# Patient Record
Sex: FEMALE | Race: WHITE | NOT HISPANIC OR LATINO | Employment: FULL TIME | URBAN - METROPOLITAN AREA
[De-identification: names, ages, dates, MRNs, and addresses within clinical notes are randomized per-mention and may not be internally consistent; named-entity substitution may affect disease eponyms.]

---
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
## 2020-01-10 NOTE — THERAPY TREATMENT NOTE
"Outpatient Occupational Therapy Ortho Treatment Note  GERARDO Mcguire     Patient Name: Nelda Givens  : 2000  MRN: [id number]  Today's Date: 10/4/2017        Visit Date: 10/04/2017    There is no problem list on file for this patient.       Past Medical History:   Diagnosis Date   • Asthma         No past surgical history on file.      Visit Dx:    ICD-10-CM ICD-9-CM   1. Sprain of left wrist, subsequent encounter S63.502D V58.89     842.00             OT Ortho       10/04/17 1400          Subjective Pain    Able to rate subjective pain? yes  -EN      Pre-Treatment Pain Level 4  -EN      Subjective Pain Comment \"The pain is finally starting to go down.\"  -EN      Left Wrist    Flexion AROM Deficit 91  -EN      Extension AROM Deficit 67  -EN      LUE Edema - Circumference (cm)    Wrist Crease --   2\" above wrist crease- 17.1 cm  -EN        User Key  (r) = Recorded By, (t) = Taken By, (c) = Cosigned By    Initials Name Provider Type    GARETT Luna Occupational Therapist             Hand Therapy (last 24 hours)      Hand Eval       10/04/17 1500           Strength Left    # Reps 1  -EN      Left Rung 2  -EN      Left  Test 1 46  -EN       Strength Average Left 46  -EN        User Key  (r) = Recorded By, (t) = Taken By, (c) = Cosigned By    Initials Name Provider Type    GARETT Luna Occupational Therapist                    Therapy Education       10/04/17 1518          Therapy Education    Given HEP  -EN      Program Progressed  -EN      How Provided Verbal  -EN      Provided to Patient  -EN      Level of Understanding Verbalized  -EN        User Key  (r) = Recorded By, (t) = Taken By, (c) = Cosigned By    Initials Name Provider Type    GARETT Luna Occupational Therapist                OT Assessment/Plan       10/04/17 1516       OT Assessment    Impairments Edema;Range of motion;Muscle strength;Pain  -EN     Assessment Comments Patient with significant " improvement with wrist ROM, strength, and decrease in pain.  Patient reports pain down to 4/10 at rest.  Progressing with strengthening program.  -EN     OT Plan    OT Plan Comments MD appointment today.  Continue with goals.  -EN       User Key  (r) = Recorded By, (t) = Taken By, (c) = Cosigned By    Initials Name Provider Type    GARETT Luna Occupational Therapist                     Modalities       10/04/17 1439          Moist Heat    MH Applied Yes  -EN      Location left wrist/forearm  -EN      Rx Minutes 15 mins  -EN      Ultrasound 14959    Location left dorsal forearm  -EN      Rx Minutes 8  -EN      Duty Cycle 50  -EN      Frequency 3.0 MHz  -EN      Intensity - Wts/cm 1  -EN        User Key  (r) = Recorded By, (t) = Taken By, (c) = Cosigned By    Initials Name Provider Type    GARETT Luna Occupational Therapist                OT Exercises       10/04/17 1400          Exercise 1    Exercise Name 1 retrograde edema massage, left wrist and forearm  -EN      Exercise 2    Exercise Name 2 Wrist ROM exercises  -EN      Exercise 3    Exercise Name 3 wrist strengthening- Flexion/extension  -EN      Cueing 3 Verbal  -EN      Equipment 3 Dumbell  -EN      Weights/Plates 3 1  -EN      Sets 3 1  -EN      Reps 3 10  -EN      Exercise 4    Exercise Name 4 Hand strenghening-  -EN      Cueing 4 Verbal  -EN      Equipment 4 Hand Gripper   digiflex -red (5reps, hold 3 seconds)  -EN      Weights/Plates 4 20  -EN      Sets 4 1  -EN      Reps 4 10  -EN      Time (Seconds) 4 3  -EN      Exercise 5    Exercise Name 5 forearm strengthening- Pronation/supination  -EN      Cueing 5 Verbal   slight increase in pain with strenghtening  -EN      Equipment 5 Dumbell  -EN      Weights/Plates 5 1  -EN      Sets 5 1  -EN      Reps 5 5  -EN        User Key  (r) = Recorded By, (t) = Taken By, (c) = Cosigned By    Initials Name Provider Type    GARETT Luna Occupational Therapist                     Outcome Measures       10/02/17 0800          Quick DASH    Open a tight or new jar. 3  -EN      Do heavy household chores (e.g., wash walls, wash floors) 3  -EN      Carry a shopping bag or briefcase 2  -EN      Wash your back 1  -EN      Use a knife to cut food 2  -EN      Recreational activities in which you take some force or impact through your arm, should or hand (e.g. golf, hammering, tennis, etc.) 4  -EN      During the past week, to what extent has your arm, shoulder, or hand problem interfered with your normal social activites with family, friends, neighbors or groups? 3  -EN      During the past week, were you limited in your work or other regular daily activities as a result of your arm, shoulder or hand problem? 4  -EN      Arm, Shoulder, or hand pain 4  -EN      Tingling (pins and needles) in your arm, shoulder, or hand 3  -EN      During the past week, how much difficulty have you had sleeping because of the pain in your arm, shoulder or hand? 3  -EN      Number of Questions Answered 11  -EN      Quick DASH Score 47.73  -EN      Work Module (Optional)    Using your usual technique for your work? 3  -EN      Doing your usual work because of arm, shoulder or hand pain? 3  -EN      Doing your work as well as you would like? 3  -EN      Spending your usual amount of time doing your work? 3  -EN      Work Module Score 50  -EN      Functional Assessment    Outcome Measure Options Quick DASH  -EN        User Key  (r) = Recorded By, (t) = Taken By, (c) = Cosigned By    Initials Name Provider Type    GARTET Luna Occupational Therapist              Time Calculation:   OT Start Time: 1439  OT Stop Time: 1517  OT Time Calculation (min): 38 min     Therapy Charges for Today     Code Description Service Date Service Provider Modifiers Qty    [id number]  OT THERAPEUTIC ACT EA 15 MIN 10/4/2017 GARETT Chopra GO 1    [id number]  OT HOT OR COLD PACK TREAT MCARE 10/4/2017 Sachi MOSQUERA  GARETT Zhou GO 1    [id number]  OT ULTRASOUND EA 15 MIN 10/4/2017 GARETT Chopra GO 1                    GARETT Blandon  10/4/2017   No excercise

## 2020-07-13 ENCOUNTER — HOSPITAL ENCOUNTER (EMERGENCY)
Facility: HOSPITAL | Age: 20
Discharge: LEFT BEFORE DISCHARGE | End: 2020-07-13

## 2020-07-13 VITALS
WEIGHT: 195 LBS | SYSTOLIC BLOOD PRESSURE: 127 MMHG | DIASTOLIC BLOOD PRESSURE: 80 MMHG | OXYGEN SATURATION: 97 % | TEMPERATURE: 98.1 F | HEART RATE: 71 BPM | HEIGHT: 64 IN | RESPIRATION RATE: 16 BRPM | BODY MASS INDEX: 33.29 KG/M2

## 2020-12-02 ENCOUNTER — PROCEDURE VISIT (OUTPATIENT)
Dept: OBSTETRICS AND GYNECOLOGY | Age: 20
End: 2020-12-02

## 2020-12-02 ENCOUNTER — OFFICE VISIT (OUTPATIENT)
Dept: OBSTETRICS AND GYNECOLOGY | Age: 20
End: 2020-12-02

## 2020-12-02 VITALS
SYSTOLIC BLOOD PRESSURE: 124 MMHG | HEIGHT: 63 IN | WEIGHT: 196 LBS | DIASTOLIC BLOOD PRESSURE: 70 MMHG | BODY MASS INDEX: 34.73 KG/M2

## 2020-12-02 DIAGNOSIS — O36.80X0 ENCOUNTER TO DETERMINE FETAL VIABILITY OF PREGNANCY, SINGLE OR UNSPECIFIED FETUS: Primary | ICD-10-CM

## 2020-12-02 DIAGNOSIS — Z34.00 SUPERVISION OF NORMAL FIRST PREGNANCY, ANTEPARTUM: ICD-10-CM

## 2020-12-02 DIAGNOSIS — Z01.419 WELL WOMAN EXAM WITH ROUTINE GYNECOLOGICAL EXAM: Primary | ICD-10-CM

## 2020-12-02 LAB
C TRACH RRNA SPEC DONR QL NAA+PROBE: NEGATIVE
N GONORRHOEA DNA SPEC QL NAA+PROBE: NEGATIVE

## 2020-12-02 PROCEDURE — 99385 PREV VISIT NEW AGE 18-39: CPT | Performed by: NURSE PRACTITIONER

## 2020-12-02 PROCEDURE — 76801 OB US < 14 WKS SINGLE FETUS: CPT | Performed by: OBSTETRICS & GYNECOLOGY

## 2020-12-02 NOTE — PROGRESS NOTES
[facility]  Routine Annual Visit    2020    Patient: Madie Givens          MR#:[id number]      History of Present Illness    20 y.o. female  who presents for annual exam and confirmation of pregnancy as a new patient.    US confirms viable IUP at 10w3d. .  This is Madie's first pregnancy  She is excited and feeling well  Denies any significant medical hx.  Asthma- no problems in years she reports  She is taking a prenatal vitamin  No complaints today    Patient's last menstrual period was 2020.  Obstetric History:  OB History        1    Para   0    Term   0       0    AB   0    Living   0       SAB   0    TAB   0    Ectopic   0    Molar   0    Multiple   0    Live Births   0               Menstrual History:     Patient's last menstrual period was 2020.       Sexual History:       ________________________________________  Patient Active Problem List   Diagnosis   • Supervision of normal first pregnancy, antepartum       Past Medical History:   Diagnosis Date   • Asthma        History reviewed. No pertinent surgical history.    Social History     Tobacco Use   Smoking Status Never Smoker   Smokeless Tobacco Never Used       Family History   Problem Relation Age of Onset   • No Known Problems Mother    • No Known Problems Father        Prior to Admission medications    Medication Sig Start Date End Date Taking? Authorizing Provider   Chlorcyclizine-Pseudoephed (STAHIST AD) 25-60 MG tablet Take 1 tablet by mouth 3 (Three) Times a Day As Needed (for congestion). 8/3/19 12/2/20  Keren Denton APRN   promethazine-dextromethorphan (PROMETHAZINE-DM) 6.25-15 MG/5ML syrup Take 5 mL by mouth 4 (Four) Times a Day As Needed for Cough. 8/3/19 12/2/20  Keren Denton APRN   TRI FEMYNOR 0.18/0.215/0.25 MG-35 MCG per tablet Take 1 tablet by mouth Daily. 19  Emergency, Nurse Ralph, MARCELINO     ________________________________________      The following  "portions of the patient's history were reviewed and updated as appropriate: allergies, current medications, past family history, past medical history, past social history, past surgical history and problem list.    Review of Systems   Constitutional: Negative.    HENT: Negative.    Eyes: Negative for visual disturbance.   Respiratory: Negative for cough, shortness of breath and wheezing.    Cardiovascular: Negative for chest pain, palpitations and leg swelling.   Gastrointestinal: Negative for abdominal distention, abdominal pain, blood in stool, constipation, diarrhea, nausea and vomiting.   Endocrine: Negative for cold intolerance and heat intolerance.   Genitourinary: Negative for difficulty urinating, dyspareunia, dysuria, frequency, genital sores, hematuria, menstrual problem, pelvic pain, urgency, vaginal bleeding, vaginal discharge and vaginal pain.   Musculoskeletal: Negative.    Skin: Negative.    Neurological: Negative for dizziness, weakness, light-headedness, numbness and headaches.   Hematological: Negative.    Psychiatric/Behavioral: Negative.    Breasts: negative for lumps skin changes, dimpling, swelling, nipple changes/discharge bilaterally           Objective   Physical Exam    /70   Ht 160 cm (63\")   Wt 88.9 kg (196 lb)   LMP 09/20/2020   Breastfeeding No   BMI 34.72 kg/m²    BP Readings from Last 3 Encounters:   12/02/20 124/70   07/13/20 127/80   08/03/19 125/73      Wt Readings from Last 3 Encounters:   12/02/20 88.9 kg (196 lb)   07/13/20 88.5 kg (195 lb)   08/03/19 82.1 kg (181 lb)        BMI: Estimated body mass index is 34.72 kg/m² as calculated from the following:    Height as of this encounter: 160 cm (63\").    Weight as of this encounter: 88.9 kg (196 lb).            General:   alert, appears stated age and cooperative   Heart: regular rate and rhythm, S1, S2 normal, no murmur, click, rub or gallop   Lungs: clear to auscultation bilaterally   Abdomen: soft, non-tender, without " masses or organomegaly   Breast: inspection negative, no nipple discharge or bleeding, no masses or nodularity palpable   Vulva: External genitalia including bartholin's glands, Urethra, Tarrant's gland and urethra meatus are normal, Perineum, rectum and anus appear normal  and Bladder appears normal without significant prolapse    Vagina: normal mucosa, normal discharge   Cervix: no cervical motion tenderness and no lesions   Uterus: deferred   Adnexa: deferred     Assessment:    1. Normal annual exam  2. Viable IUP  3. Early pregnancy counseling provided   Patient is taking Prenatal vitamins  Problem list reviewed and updated  Reviewed routine prenatal care with the office to include but not limited to expected weight gain during pregnancy, Tylenol products are fine, avoid aspirin and ibuprofen; Zika (travel restrictions/ok to use insect repellant); not to change cat litter; food restrictions; avoidance of alcohol, tobacco, drugs and saunas/hot tubs.   All questions answered.  4. SAB warnings  5.  Counseled on healthy lifestyle modifications, safe sex, condom use, and self breast exams.    RTO 4 weeks OB intake     Plan:    []  Rx:   []  Mammogram request made  []  PAP done  []  Occult fecal blood test ([facility])  []  Labs:   []  GC/Chl/TV  []  DEXA scan   []  Referral for colonoscopy:           FRANK Medina  12/2/2020 11:12 EST

## 2020-12-03 LAB
ABO GROUP BLD: NORMAL
BLD GP AB SCN SERPL QL: NEGATIVE
ERYTHROCYTE [DISTWIDTH] IN BLOOD BY AUTOMATED COUNT: 14.7 % (ref 12.3–15.4)
HBA1C MFR BLD: 5 % (ref 4.8–5.6)
HBV SURFACE AG SERPL QL IA: NEGATIVE
HCT VFR BLD AUTO: 37.3 % (ref 34–46.6)
HCV AB S/CO SERPL IA: <0.1 S/CO RATIO (ref 0–0.9)
HGB A MFR BLD: 98.2 % (ref 96.4–98.8)
HGB A2 MFR BLD COLUMN CHROM: 1.8 % (ref 1.8–3.2)
HGB BLD-MCNC: 11.9 G/DL (ref 12–15.9)
HGB C MFR BLD: 0 %
HGB F MFR BLD: 0 % (ref 0–2)
HGB FRACT BLD-IMP: NORMAL
HGB S BLD QL SOLY: NEGATIVE
HGB S MFR BLD: 0 %
HIV 1+2 AB+HIV1 P24 AG SERPL QL IA: NON REACTIVE
MCH RBC QN AUTO: 26.1 PG (ref 26.6–33)
MCHC RBC AUTO-ENTMCNC: 31.9 G/DL (ref 31.5–35.7)
MCV RBC AUTO: 81.8 FL (ref 79–97)
PLATELET # BLD AUTO: 204 10*3/MM3 (ref 140–450)
RBC # BLD AUTO: 4.56 10*6/MM3 (ref 3.77–5.28)
RH BLD: NEGATIVE
RPR SER QL: NORMAL
RUBV IGG SERPL IA-ACNC: <0.9 INDEX
VZV IGG SER IA-ACNC: <135 INDEX
WBC # BLD AUTO: 2.55 10*3/MM3 (ref 3.4–10.8)

## 2020-12-04 LAB
BACTERIA UR CULT: NORMAL
BACTERIA UR CULT: NORMAL
C TRACH RRNA CVX QL NAA+PROBE: NEGATIVE
CONV .: NORMAL
CYTOLOGIST CVX/VAG CYTO: NORMAL
CYTOLOGY CVX/VAG DOC CYTO: NORMAL
CYTOLOGY CVX/VAG DOC THIN PREP: NORMAL
DX ICD CODE: NORMAL
HIV 1 & 2 AB SER-IMP: NORMAL
N GONORRHOEA RRNA CVX QL NAA+PROBE: NEGATIVE
OTHER STN SPEC: NORMAL
STAT OF ADQ CVX/VAG CYTO-IMP: NORMAL
T VAGINALIS RRNA SPEC QL NAA+PROBE: NEGATIVE

## 2020-12-17 DIAGNOSIS — B96.89 BV (BACTERIAL VAGINOSIS): ICD-10-CM

## 2020-12-17 DIAGNOSIS — D72.819 LEUKOPENIA, UNSPECIFIED TYPE: Primary | ICD-10-CM

## 2020-12-17 DIAGNOSIS — N76.0 BV (BACTERIAL VAGINOSIS): ICD-10-CM

## 2020-12-17 PROBLEM — O09.899 MATERNAL VARICELLA, NON-IMMUNE: Status: ACTIVE | Noted: 2020-12-17

## 2020-12-17 PROBLEM — Z67.91 RH NEGATIVE STATUS DURING PREGNANCY: Status: ACTIVE | Noted: 2020-12-17

## 2020-12-17 PROBLEM — Z28.39 RUBELLA NON-IMMUNE STATUS, ANTEPARTUM: Status: ACTIVE | Noted: 2020-12-17

## 2020-12-17 PROBLEM — O09.899 RUBELLA NON-IMMUNE STATUS, ANTEPARTUM: Status: ACTIVE | Noted: 2020-12-17

## 2020-12-17 PROBLEM — Z28.39 MATERNAL VARICELLA, NON-IMMUNE: Status: ACTIVE | Noted: 2020-12-17

## 2020-12-17 PROBLEM — O26.899 RH NEGATIVE STATUS DURING PREGNANCY: Status: ACTIVE | Noted: 2020-12-17

## 2020-12-17 RX ORDER — METRONIDAZOLE 500 MG/1
500 TABLET ORAL 2 TIMES DAILY
Qty: 14 TABLET | Refills: 0 | Status: SHIPPED | OUTPATIENT
Start: 2020-12-17 | End: 2020-12-24

## 2020-12-17 NOTE — PROGRESS NOTES
Please call patient and notify of BV infection on pap - rx sent to pharmacy - also very low white count - can she come by office for repeat labs?

## 2020-12-22 ENCOUNTER — RESULTS ENCOUNTER (OUTPATIENT)
Dept: OBSTETRICS AND GYNECOLOGY | Age: 20
End: 2020-12-22

## 2020-12-22 ENCOUNTER — INITIAL PRENATAL (OUTPATIENT)
Dept: OBSTETRICS AND GYNECOLOGY | Age: 20
End: 2020-12-22

## 2020-12-22 VITALS — BODY MASS INDEX: 34.37 KG/M2 | DIASTOLIC BLOOD PRESSURE: 70 MMHG | SYSTOLIC BLOOD PRESSURE: 127 MMHG | WEIGHT: 194 LBS

## 2020-12-22 DIAGNOSIS — O09.899 RUBELLA NON-IMMUNE STATUS, ANTEPARTUM: ICD-10-CM

## 2020-12-22 DIAGNOSIS — N76.0 BV (BACTERIAL VAGINOSIS): ICD-10-CM

## 2020-12-22 DIAGNOSIS — O09.899 MATERNAL VARICELLA, NON-IMMUNE: ICD-10-CM

## 2020-12-22 DIAGNOSIS — D72.819 LEUKOPENIA, UNSPECIFIED TYPE: ICD-10-CM

## 2020-12-22 DIAGNOSIS — B96.89 BV (BACTERIAL VAGINOSIS): ICD-10-CM

## 2020-12-22 DIAGNOSIS — O26.891 RH NEGATIVE STATUS DURING PREGNANCY IN FIRST TRIMESTER: ICD-10-CM

## 2020-12-22 DIAGNOSIS — Z28.39 RUBELLA NON-IMMUNE STATUS, ANTEPARTUM: ICD-10-CM

## 2020-12-22 DIAGNOSIS — Z13.89 SCREENING FOR BLOOD OR PROTEIN IN URINE: ICD-10-CM

## 2020-12-22 DIAGNOSIS — Z34.01 ENCOUNTER FOR PRENATAL CARE IN FIRST TRIMESTER OF FIRST PREGNANCY: Primary | ICD-10-CM

## 2020-12-22 DIAGNOSIS — Z28.39 MATERNAL VARICELLA, NON-IMMUNE: ICD-10-CM

## 2020-12-22 DIAGNOSIS — Z67.91 RH NEGATIVE STATUS DURING PREGNANCY IN FIRST TRIMESTER: ICD-10-CM

## 2020-12-22 DIAGNOSIS — O21.9 NAUSEA AND VOMITING DURING PREGNANCY: ICD-10-CM

## 2020-12-22 LAB
BASOPHILS # BLD AUTO: 0.01 10*3/MM3 (ref 0–0.2)
BASOPHILS NFR BLD AUTO: 0.2 % (ref 0–1.5)
BILIRUB BLD-MCNC: ABNORMAL MG/DL
EOSINOPHIL # BLD AUTO: 0.04 10*3/MM3 (ref 0–0.4)
EOSINOPHIL NFR BLD AUTO: 0.7 % (ref 0.3–6.2)
ERYTHROCYTE [DISTWIDTH] IN BLOOD BY AUTOMATED COUNT: 15.7 % (ref 12.3–15.4)
GLUCOSE UR STRIP-MCNC: NEGATIVE MG/DL
HCT VFR BLD AUTO: 33.2 % (ref 34–46.6)
HGB BLD-MCNC: 11.3 G/DL (ref 12–15.9)
IMM GRANULOCYTES # BLD AUTO: 0.02 10*3/MM3 (ref 0–0.05)
IMM GRANULOCYTES NFR BLD AUTO: 0.3 % (ref 0–0.5)
KETONES UR QL: NEGATIVE
LEUKOCYTE EST, POC: ABNORMAL
LYMPHOCYTES # BLD AUTO: 0.72 10*3/MM3 (ref 0.7–3.1)
LYMPHOCYTES NFR BLD AUTO: 12 % (ref 19.6–45.3)
MCH RBC QN AUTO: 28.3 PG (ref 26.6–33)
MCHC RBC AUTO-ENTMCNC: 34 G/DL (ref 31.5–35.7)
MCV RBC AUTO: 83.2 FL (ref 79–97)
MONOCYTES # BLD AUTO: 0.4 10*3/MM3 (ref 0.1–0.9)
MONOCYTES NFR BLD AUTO: 6.7 % (ref 5–12)
NEUTROPHILS # BLD AUTO: 4.79 10*3/MM3 (ref 1.7–7)
NEUTROPHILS NFR BLD AUTO: 80.1 % (ref 42.7–76)
NITRITE UR-MCNC: NEGATIVE MG/ML
NRBC BLD AUTO-RTO: 0 /100 WBC (ref 0–0.2)
PH UR: 6.5 [PH] (ref 5–8)
PLATELET # BLD AUTO: 235 10*3/MM3 (ref 140–450)
PROT UR STRIP-MCNC: NEGATIVE MG/DL
RBC # BLD AUTO: 3.99 10*6/MM3 (ref 3.77–5.28)
RBC # UR STRIP: NEGATIVE /UL
SP GR UR: 1.02 (ref 1–1.03)
UROBILINOGEN UR QL: NORMAL
WBC # BLD AUTO: 5.98 10*3/MM3 (ref 3.4–10.8)

## 2020-12-22 PROCEDURE — 99214 OFFICE O/P EST MOD 30 MIN: CPT | Performed by: OBSTETRICS & GYNECOLOGY

## 2020-12-22 RX ORDER — ONDANSETRON 4 MG/1
4 TABLET, FILM COATED ORAL EVERY 6 HOURS PRN
Qty: 30 TABLET | Refills: 1 | Status: SHIPPED | OUTPATIENT
Start: 2020-12-22 | End: 2021-04-20 | Stop reason: SDUPTHER

## 2020-12-22 NOTE — PROGRESS NOTES
Chief Complaint   Patient presents with   • Routine Prenatal Visit     cc: ob intake , needs CBC labs -low white blood count , pt c/o morning sickness no other complaints , last pap 20 neg pos for BV , Pt declines flu vaccine , would like something prescribed for nausea        HPI: 20 y.o.  at 13w2d presents for ob intake     Vitals:    20 0841   BP: 127/70   Weight: 88 kg (194 lb)       Review of systems:     Gen: negative  CV:     negative  GI: nausea  :   negative  MS:    negative  Neuro: negative  Pul: negative      A/P  1. Intrauterine pregnancy at 13w2d   2. Pregnancy Risk:  HIGH RISK        Diagnoses and all orders for this visit:    1. Encounter for prenatal care in first trimester of first pregnancy (Primary)    2. Screening for blood or protein in urine  -     POC Urinalysis Dipstick    3. Leukopenia, unspecified type    4. BV (bacterial vaginosis)    5. Rh negative status during pregnancy in first trimester    6. Maternal varicella, non-immune    7. Rubella non-immune status, antepartum    8. Nausea and vomiting during pregnancy  -     ondansetron (Zofran) 4 MG tablet; Take 1 tablet by mouth Every 6 (Six) Hours As Needed for Nausea or Vomiting.  Dispense: 30 tablet; Refill: 1        Nutrition and weight gain were addressed.  Practice OB call structure was discussed.       -----------------------  PLAN:   Return in about 5 weeks (around 2021), or ob check and anatomy US.  OB intake done   Prenatal labs reviewed   Leukopenia - recheck CBC today   RH neg - rhogam at 28 weeks   Counseling was given to patient for the following topics: instructions for management and risk factor reductions . Total time of the encounter was 25 minutes and 20 minutes was spend counseling.     Skylar Singh MD  2020 09:08 EST

## 2020-12-23 PROBLEM — D72.819 LEUKOPENIA: Status: RESOLVED | Noted: 2020-12-17 | Resolved: 2020-12-23

## 2021-01-26 ENCOUNTER — ROUTINE PRENATAL (OUTPATIENT)
Dept: OBSTETRICS AND GYNECOLOGY | Age: 21
End: 2021-01-26

## 2021-01-26 VITALS — SYSTOLIC BLOOD PRESSURE: 128 MMHG | DIASTOLIC BLOOD PRESSURE: 70 MMHG | WEIGHT: 190 LBS | BODY MASS INDEX: 33.66 KG/M2

## 2021-01-26 DIAGNOSIS — O09.899 MATERNAL VARICELLA, NON-IMMUNE: ICD-10-CM

## 2021-01-26 DIAGNOSIS — O21.9 NAUSEA AND VOMITING DURING PREGNANCY: ICD-10-CM

## 2021-01-26 DIAGNOSIS — O28.3 ABNORMAL FETAL ULTRASOUND: ICD-10-CM

## 2021-01-26 DIAGNOSIS — Z67.91 RH NEGATIVE STATUS DURING PREGNANCY IN SECOND TRIMESTER: ICD-10-CM

## 2021-01-26 DIAGNOSIS — O09.899 RUBELLA NON-IMMUNE STATUS, ANTEPARTUM: ICD-10-CM

## 2021-01-26 DIAGNOSIS — Z28.39 RUBELLA NON-IMMUNE STATUS, ANTEPARTUM: ICD-10-CM

## 2021-01-26 DIAGNOSIS — Z28.39 MATERNAL VARICELLA, NON-IMMUNE: ICD-10-CM

## 2021-01-26 DIAGNOSIS — Z34.02 ENCOUNTER FOR PRENATAL CARE IN SECOND TRIMESTER OF FIRST PREGNANCY: Primary | ICD-10-CM

## 2021-01-26 DIAGNOSIS — Z13.89 SCREENING FOR BLOOD OR PROTEIN IN URINE: ICD-10-CM

## 2021-01-26 DIAGNOSIS — O26.892 RH NEGATIVE STATUS DURING PREGNANCY IN SECOND TRIMESTER: ICD-10-CM

## 2021-01-26 DIAGNOSIS — O99.210 OBESITY IN PREGNANCY, ANTEPARTUM: ICD-10-CM

## 2021-01-26 LAB
BILIRUB BLD-MCNC: NEGATIVE MG/DL
GLUCOSE UR STRIP-MCNC: NEGATIVE MG/DL
KETONES UR QL: NEGATIVE
LEUKOCYTE EST, POC: NEGATIVE
NITRITE UR-MCNC: NEGATIVE MG/ML
PH UR: 7 [PH] (ref 5–8)
PROT UR STRIP-MCNC: NEGATIVE MG/DL
RBC # UR STRIP: NEGATIVE /UL
SP GR UR: 1.02 (ref 1–1.03)
UROBILINOGEN UR QL: NORMAL

## 2021-01-26 PROCEDURE — 99213 OFFICE O/P EST LOW 20 MIN: CPT | Performed by: OBSTETRICS & GYNECOLOGY

## 2021-01-26 NOTE — PROGRESS NOTES
Chief Complaint   Patient presents with   • Routine Prenatal Visit     cc: anatomy scan no c/o , zofran helps nausea        HPI: 20 y.o.  at 18w2d presents for prenatal care     Vitals:    21 0821   BP: 128/70   Weight: 86.2 kg (190 lb)       Review of systems:     Gen: negative  CV:     negative  GI: nausea  :   negative  MS:    negative  Neuro: negative  Pul: negative      A/P  1. Intrauterine pregnancy at 18w2d   2. Pregnancy Risk:  HIGH RISK        Diagnoses and all orders for this visit:    1. Encounter for prenatal care in second trimester of first pregnancy (Primary)    2. Screening for blood or protein in urine  -     POC Urinalysis Dipstick    3. Rubella non-immune status, antepartum    4. Rh negative status during pregnancy in second trimester    5. Nausea and vomiting during pregnancy    6. Maternal varicella, non-immune    7. Obesity in pregnancy, antepartum              -----------------------  PLAN:   Return in about 4 weeks (around 2021), or ob chek and repeat anatomy.  Incomplete anatomy - repeat 4 weeks   Nausea - improved with Zofran  VNI and RNI - vax PP  Rh neg - rhogam at 28 weeks   SAB warnings         Skylar Singh MD  2021 09:24 EST

## 2021-02-23 ENCOUNTER — ROUTINE PRENATAL (OUTPATIENT)
Dept: OBSTETRICS AND GYNECOLOGY | Age: 21
End: 2021-02-23

## 2021-02-23 VITALS — SYSTOLIC BLOOD PRESSURE: 120 MMHG | BODY MASS INDEX: 33.66 KG/M2 | DIASTOLIC BLOOD PRESSURE: 78 MMHG | WEIGHT: 190 LBS

## 2021-02-23 DIAGNOSIS — Z34.02 ENCOUNTER FOR PRENATAL CARE IN SECOND TRIMESTER OF FIRST PREGNANCY: Primary | ICD-10-CM

## 2021-02-23 DIAGNOSIS — O09.899 MATERNAL VARICELLA, NON-IMMUNE: ICD-10-CM

## 2021-02-23 DIAGNOSIS — Z67.91 RH NEGATIVE STATUS DURING PREGNANCY IN SECOND TRIMESTER: ICD-10-CM

## 2021-02-23 DIAGNOSIS — O09.899 RUBELLA NON-IMMUNE STATUS, ANTEPARTUM: ICD-10-CM

## 2021-02-23 DIAGNOSIS — O26.892 RH NEGATIVE STATUS DURING PREGNANCY IN SECOND TRIMESTER: ICD-10-CM

## 2021-02-23 DIAGNOSIS — O99.210 OBESITY IN PREGNANCY, ANTEPARTUM: ICD-10-CM

## 2021-02-23 DIAGNOSIS — O28.3 ABNORMAL FETAL ULTRASOUND: ICD-10-CM

## 2021-02-23 DIAGNOSIS — Z28.39 MATERNAL VARICELLA, NON-IMMUNE: ICD-10-CM

## 2021-02-23 DIAGNOSIS — Z28.39 RUBELLA NON-IMMUNE STATUS, ANTEPARTUM: ICD-10-CM

## 2021-02-23 DIAGNOSIS — Z13.89 SCREENING FOR BLOOD OR PROTEIN IN URINE: ICD-10-CM

## 2021-02-23 LAB
BILIRUB BLD-MCNC: NEGATIVE MG/DL
GLUCOSE UR STRIP-MCNC: NEGATIVE MG/DL
KETONES UR QL: NEGATIVE
LEUKOCYTE EST, POC: NEGATIVE
NITRITE UR-MCNC: NEGATIVE MG/ML
PH UR: 7.5 [PH] (ref 5–8)
PROT UR STRIP-MCNC: NEGATIVE MG/DL
RBC # UR STRIP: NEGATIVE /UL
SP GR UR: 1.02 (ref 1–1.03)
UROBILINOGEN UR QL: NORMAL

## 2021-02-23 PROCEDURE — 99213 OFFICE O/P EST LOW 20 MIN: CPT | Performed by: OBSTETRICS & GYNECOLOGY

## 2021-02-23 NOTE — PROGRESS NOTES
Chief Complaint   Patient presents with   • Routine Prenatal Visit     cc: repeat anatomy , no ob complaints        HPI: 20 y.o.  at 22w2d presents for prenatal care      Vitals:    21 0838   BP: 120/78   Weight: 86.2 kg (190 lb)       Review of systems:     Gen: negative  CV:     negative  GI: negative  :   negative and good fetal movement noted   MS:    negative  Neuro: negative  Pul: negative      A/P  1. Intrauterine pregnancy at 22w2d   2. Pregnancy Risk:  HIGH RISK        Diagnoses and all orders for this visit:    1. Encounter for prenatal care in second trimester of first pregnancy (Primary)    2. Screening for blood or protein in urine  -     POC Urinalysis Dipstick    3. incomplete anatomic survey     4. Maternal varicella, non-immune    5. Obesity in pregnancy, antepartum    6. Rh negative status during pregnancy in second trimester    7. Rubella non-immune status, antepartum         labor was discussed.  Warnings were provided.      -----------------------  PLAN:   Return in about 4 weeks (around 3/23/2021), or ob check and one-hour gtt.  Completed anatomy today   RNI - MMR PP   Rh neg - rhogam at 28 weeks   VNI - varivax PP  Obesity - 6 pound weight loss       Skylar Singh MD  2021 09:03 EST

## 2021-03-23 ENCOUNTER — ROUTINE PRENATAL (OUTPATIENT)
Dept: OBSTETRICS AND GYNECOLOGY | Age: 21
End: 2021-03-23

## 2021-03-23 VITALS — SYSTOLIC BLOOD PRESSURE: 130 MMHG | BODY MASS INDEX: 34.72 KG/M2 | WEIGHT: 196 LBS | DIASTOLIC BLOOD PRESSURE: 80 MMHG

## 2021-03-23 DIAGNOSIS — Z23 ENCOUNTER FOR ADMINISTRATION OF VACCINE: ICD-10-CM

## 2021-03-23 DIAGNOSIS — O09.899 RUBELLA NON-IMMUNE STATUS, ANTEPARTUM: ICD-10-CM

## 2021-03-23 DIAGNOSIS — Z13.1 SCREENING FOR DIABETES MELLITUS: ICD-10-CM

## 2021-03-23 DIAGNOSIS — Z13.0 SCREENING FOR IRON DEFICIENCY ANEMIA: ICD-10-CM

## 2021-03-23 DIAGNOSIS — Z13.89 SCREENING FOR BLOOD OR PROTEIN IN URINE: ICD-10-CM

## 2021-03-23 DIAGNOSIS — O99.210 OBESITY IN PREGNANCY, ANTEPARTUM: ICD-10-CM

## 2021-03-23 DIAGNOSIS — O26.892 RH NEGATIVE STATUS DURING PREGNANCY IN SECOND TRIMESTER: ICD-10-CM

## 2021-03-23 DIAGNOSIS — Z67.91 RH NEGATIVE STATUS DURING PREGNANCY IN SECOND TRIMESTER: ICD-10-CM

## 2021-03-23 DIAGNOSIS — Z34.00 SUPERVISION OF NORMAL FIRST PREGNANCY, ANTEPARTUM: ICD-10-CM

## 2021-03-23 DIAGNOSIS — Z34.02 ENCOUNTER FOR PRENATAL CARE IN SECOND TRIMESTER OF FIRST PREGNANCY: Primary | ICD-10-CM

## 2021-03-23 DIAGNOSIS — Z28.39 RUBELLA NON-IMMUNE STATUS, ANTEPARTUM: ICD-10-CM

## 2021-03-23 DIAGNOSIS — Z28.39 MATERNAL VARICELLA, NON-IMMUNE: ICD-10-CM

## 2021-03-23 DIAGNOSIS — O99.019 MATERNAL ANEMIA IN PREGNANCY, ANTEPARTUM: ICD-10-CM

## 2021-03-23 DIAGNOSIS — O09.899 MATERNAL VARICELLA, NON-IMMUNE: ICD-10-CM

## 2021-03-23 LAB
BILIRUB BLD-MCNC: NEGATIVE MG/DL
GLUCOSE UR STRIP-MCNC: ABNORMAL MG/DL
KETONES UR QL: NEGATIVE
LEUKOCYTE EST, POC: NEGATIVE
NITRITE UR-MCNC: NEGATIVE MG/ML
PH UR: 7.5 [PH] (ref 5–8)
PROT UR STRIP-MCNC: NEGATIVE MG/DL
RBC # UR STRIP: NEGATIVE /UL
SP GR UR: 1.02 (ref 1–1.03)
UROBILINOGEN UR QL: NORMAL

## 2021-03-23 PROCEDURE — 90471 IMMUNIZATION ADMIN: CPT | Performed by: OBSTETRICS & GYNECOLOGY

## 2021-03-23 PROCEDURE — 96372 THER/PROPH/DIAG INJ SC/IM: CPT | Performed by: OBSTETRICS & GYNECOLOGY

## 2021-03-23 PROCEDURE — 99213 OFFICE O/P EST LOW 20 MIN: CPT | Performed by: OBSTETRICS & GYNECOLOGY

## 2021-03-23 PROCEDURE — 90715 TDAP VACCINE 7 YRS/> IM: CPT | Performed by: OBSTETRICS & GYNECOLOGY

## 2021-03-23 NOTE — PROGRESS NOTES
Chief Complaint   Patient presents with   • Routine Prenatal Visit     cc: gtt / tdap / hh / rhogam        HPI: 20 y.o.  at 26w2d presents for prenatal care      Vitals:    21 1127   BP: 130/80   Weight: 88.9 kg (196 lb)       Review of systems:     Gen: negative  CV:     negative  GI: negative  :   negative and good fetal movement noted   MS:    negative  Neuro: negative  Pul: negative      A/P  1. Intrauterine pregnancy at 26w2d   2. Pregnancy Risk:  HIGH RISK        Diagnoses and all orders for this visit:    1. Screening for iron deficiency anemia (Primary)  -     Gestational Screen 1 Hr ([facility])  -     Hemoglobin & Hematocrit, Blood  -     Antibody Screen  -     POC Urinalysis Dipstick    2. Screening for diabetes mellitus  -     Gestational Screen 1 Hr ([facility])  -     Hemoglobin & Hematocrit, Blood  -     Antibody Screen    3. Screening for blood or protein in urine  -     Gestational Screen 1 Hr ([facility])  -     Hemoglobin & Hematocrit, Blood  -     Antibody Screen  -     POC Urinalysis Dipstick    4. Encounter for administration of vaccine  -     Gestational Screen 1 Hr ([facility])  -     Hemoglobin & Hematocrit, Blood  -     Antibody Screen  -     POC Urinalysis Dipstick    5. Supervision of normal first pregnancy, antepartum    6. Rubella non-immune status, antepartum    7. Rh negative status during pregnancy in second trimester    8. Maternal varicella, non-immune    9. Obesity in pregnancy, antepartum    Other orders  -     Tdap Vaccine Greater Than or Equal To 8yo IM  -     Rhogam Immune Globulin Immunization         labor was discussed.  Warnings were provided.      -----------------------  PLAN:   Return in about 2 weeks (around 2021), or ob check.  Rh neg - rhogam today   One-hour gtt today  Tdap today   RNI/VNI - varivax PP      Skylar Singh MD  3/23/2021 11:31 EDT

## 2021-03-24 PROBLEM — O99.019 MATERNAL ANEMIA IN PREGNANCY, ANTEPARTUM: Status: ACTIVE | Noted: 2021-03-24

## 2021-03-24 LAB
BLD GP AB SCN SERPL QL: NEGATIVE
GLUCOSE 1H P 50 G GLC PO SERPL-MCNC: 93 MG/DL (ref 65–139)
HCT VFR BLD AUTO: 29.9 % (ref 34–46.6)
HGB BLD-MCNC: 9.9 G/DL (ref 11.1–15.9)

## 2021-03-24 RX ORDER — FERROUS SULFATE 325(65) MG
325 TABLET ORAL 2 TIMES DAILY
Qty: 60 TABLET | Refills: 3 | Status: SHIPPED | OUTPATIENT
Start: 2021-03-24 | End: 2022-03-07

## 2021-03-24 NOTE — PROGRESS NOTES
Call patient and notify of normal results of one-hour but anemic - needs to start Iron supplement sent to pharmacy

## 2021-04-06 ENCOUNTER — ROUTINE PRENATAL (OUTPATIENT)
Dept: OBSTETRICS AND GYNECOLOGY | Age: 21
End: 2021-04-06

## 2021-04-06 VITALS — SYSTOLIC BLOOD PRESSURE: 124 MMHG | WEIGHT: 194 LBS | BODY MASS INDEX: 34.37 KG/M2 | DIASTOLIC BLOOD PRESSURE: 70 MMHG

## 2021-04-06 DIAGNOSIS — O99.210 OBESITY IN PREGNANCY, ANTEPARTUM: ICD-10-CM

## 2021-04-06 DIAGNOSIS — O99.019 MATERNAL ANEMIA IN PREGNANCY, ANTEPARTUM: ICD-10-CM

## 2021-04-06 DIAGNOSIS — Z28.39 RUBELLA NON-IMMUNE STATUS, ANTEPARTUM: ICD-10-CM

## 2021-04-06 DIAGNOSIS — Z28.39 MATERNAL VARICELLA, NON-IMMUNE: ICD-10-CM

## 2021-04-06 DIAGNOSIS — Z34.03 ENCOUNTER FOR PRENATAL CARE IN THIRD TRIMESTER OF FIRST PREGNANCY: Primary | ICD-10-CM

## 2021-04-06 DIAGNOSIS — Z67.91 RH NEGATIVE STATUS DURING PREGNANCY IN THIRD TRIMESTER: ICD-10-CM

## 2021-04-06 DIAGNOSIS — O09.899 RUBELLA NON-IMMUNE STATUS, ANTEPARTUM: ICD-10-CM

## 2021-04-06 DIAGNOSIS — O09.899 MATERNAL VARICELLA, NON-IMMUNE: ICD-10-CM

## 2021-04-06 DIAGNOSIS — Z13.89 SCREENING FOR BLOOD OR PROTEIN IN URINE: ICD-10-CM

## 2021-04-06 DIAGNOSIS — O26.893 RH NEGATIVE STATUS DURING PREGNANCY IN THIRD TRIMESTER: ICD-10-CM

## 2021-04-06 LAB
BILIRUB BLD-MCNC: ABNORMAL MG/DL
GLUCOSE UR STRIP-MCNC: NEGATIVE MG/DL
KETONES UR QL: ABNORMAL
LEUKOCYTE EST, POC: ABNORMAL
NITRITE UR-MCNC: NEGATIVE MG/ML
PH UR: 7 [PH] (ref 5–8)
PROT UR STRIP-MCNC: NEGATIVE MG/DL
RBC # UR STRIP: NEGATIVE /UL
SP GR UR: 1.02 (ref 1–1.03)
UROBILINOGEN UR QL: ABNORMAL

## 2021-04-06 PROCEDURE — 99213 OFFICE O/P EST LOW 20 MIN: CPT | Performed by: OBSTETRICS & GYNECOLOGY

## 2021-04-06 NOTE — PROGRESS NOTES
Chief Complaint   Patient presents with   • Routine Prenatal Visit     cc: no c/o        HPI: 20 y.o.  at 28w2d presents for prenatal care - denies complaints -      Vitals:    21 1156   BP: 124/70   Weight: 88 kg (194 lb)       Review of systems:     Gen: negative  CV:     negative  GI: negative  :   negative and good fetal movement noted   MS:    negative  Neuro: negative  Pul: negative      A/P  1. Intrauterine pregnancy at 28w2d   2. Pregnancy Risk:  HIGH RISK        Diagnoses and all orders for this visit:    1. Encounter for prenatal care in third trimester of first pregnancy (Primary)    2. Screening for blood or protein in urine  -     POC Urinalysis Dipstick    3. Maternal varicella, non-immune    4. Obesity in pregnancy, antepartum    5. Rh negative status during pregnancy in third trimester    6. Maternal anemia in pregnancy, antepartum    7. Rubella non-immune status, antepartum         labor was discussed.  Warnings were provided.  Nutrition and weight gain were addressed.      -----------------------  PLAN:   Return in about 2 weeks (around 2021), or ob check.  Obesity - doing great with weight    Rh neg - s/p Rhogam   Anemia- Ferralet samples given  PTL warnings/FKC     Skylar Singh MD  2021 12:54 EDT

## 2021-04-20 ENCOUNTER — ROUTINE PRENATAL (OUTPATIENT)
Dept: OBSTETRICS AND GYNECOLOGY | Age: 21
End: 2021-04-20

## 2021-04-20 VITALS — DIASTOLIC BLOOD PRESSURE: 68 MMHG | WEIGHT: 198 LBS | SYSTOLIC BLOOD PRESSURE: 120 MMHG | BODY MASS INDEX: 35.07 KG/M2

## 2021-04-20 DIAGNOSIS — Z67.91 RH NEGATIVE STATUS DURING PREGNANCY IN THIRD TRIMESTER: ICD-10-CM

## 2021-04-20 DIAGNOSIS — O09.899 MATERNAL VARICELLA, NON-IMMUNE: ICD-10-CM

## 2021-04-20 DIAGNOSIS — Z28.39 RUBELLA NON-IMMUNE STATUS, ANTEPARTUM: ICD-10-CM

## 2021-04-20 DIAGNOSIS — Z28.39 MATERNAL VARICELLA, NON-IMMUNE: ICD-10-CM

## 2021-04-20 DIAGNOSIS — Z34.03 ENCOUNTER FOR PRENATAL CARE IN THIRD TRIMESTER OF FIRST PREGNANCY: Primary | ICD-10-CM

## 2021-04-20 DIAGNOSIS — O99.210 OBESITY IN PREGNANCY, ANTEPARTUM: ICD-10-CM

## 2021-04-20 DIAGNOSIS — O21.9 NAUSEA AND VOMITING DURING PREGNANCY: ICD-10-CM

## 2021-04-20 DIAGNOSIS — O26.13 LOW WEIGHT GAIN DURING PREGNANCY IN THIRD TRIMESTER: ICD-10-CM

## 2021-04-20 DIAGNOSIS — O09.899 RUBELLA NON-IMMUNE STATUS, ANTEPARTUM: ICD-10-CM

## 2021-04-20 DIAGNOSIS — O26.893 RH NEGATIVE STATUS DURING PREGNANCY IN THIRD TRIMESTER: ICD-10-CM

## 2021-04-20 DIAGNOSIS — O99.019 MATERNAL ANEMIA IN PREGNANCY, ANTEPARTUM: ICD-10-CM

## 2021-04-20 DIAGNOSIS — Z13.89 SCREENING FOR BLOOD OR PROTEIN IN URINE: ICD-10-CM

## 2021-04-20 LAB
BILIRUB BLD-MCNC: NEGATIVE MG/DL
GLUCOSE UR STRIP-MCNC: NEGATIVE MG/DL
KETONES UR QL: NEGATIVE
LEUKOCYTE EST, POC: ABNORMAL
NITRITE UR-MCNC: NEGATIVE MG/ML
PH UR: 7 [PH] (ref 5–8)
PROT UR STRIP-MCNC: NEGATIVE MG/DL
RBC # UR STRIP: NEGATIVE /UL
SP GR UR: 1.01 (ref 1–1.03)
UROBILINOGEN UR QL: NORMAL

## 2021-04-20 PROCEDURE — 99213 OFFICE O/P EST LOW 20 MIN: CPT | Performed by: OBSTETRICS & GYNECOLOGY

## 2021-04-20 RX ORDER — ONDANSETRON 4 MG/1
4 TABLET, FILM COATED ORAL EVERY 6 HOURS PRN
Qty: 30 TABLET | Refills: 1 | Status: SHIPPED | OUTPATIENT
Start: 2021-04-20 | End: 2021-06-25 | Stop reason: HOSPADM

## 2021-04-20 NOTE — PROGRESS NOTES
Chief Complaint   Patient presents with   • Routine Prenatal Visit     cc: no complaints today , gtt normal , iron for anemia - samples given today , good fetal movement        HPI: 20 y.o.  at 30w2d presents for prenatal care      Vitals:    21 1144   BP: 120/68   Weight: 89.8 kg (198 lb)       Review of systems:     Gen: negative  CV:     negative  GI: negative  :   negative and good fetal movement noted   MS:    negative  Neuro: negative  Pul: negative      A/P  1. Intrauterine pregnancy at 30w2d   2. Pregnancy Risk:  HIGH RISK        Diagnoses and all orders for this visit:    1. Encounter for prenatal care in third trimester of first pregnancy (Primary)    2. Screening for blood or protein in urine  -     POC Urinalysis Dipstick    3. Maternal anemia in pregnancy, antepartum    4. Rubella non-immune status, antepartum    5. Rh negative status during pregnancy in third trimester    6. Obesity in pregnancy, antepartum    7. Maternal varicella, non-immune    8. Nausea and vomiting during pregnancy  -     ondansetron (Zofran) 4 MG tablet; Take 1 tablet by mouth Every 6 (Six) Hours As Needed for Nausea or Vomiting.  Dispense: 30 tablet; Refill: 1    9. Low weight gain during pregnancy in third trimester         labor was discussed.  Warnings were provided.      -----------------------  PLAN:   Return in about 2 weeks (around 2021), or ob check and growth US.  Anemia cont Iron   Rh neg- s/p Rhogam   Low weight gain - check growth next visit   PTL warnings      Skylar Singh MD  2021 12:06 EDT

## 2021-05-03 ENCOUNTER — ROUTINE PRENATAL (OUTPATIENT)
Dept: OBSTETRICS AND GYNECOLOGY | Age: 21
End: 2021-05-03

## 2021-05-03 VITALS — BODY MASS INDEX: 34.9 KG/M2 | SYSTOLIC BLOOD PRESSURE: 120 MMHG | DIASTOLIC BLOOD PRESSURE: 62 MMHG | WEIGHT: 197 LBS

## 2021-05-03 DIAGNOSIS — Z13.89 SCREENING FOR BLOOD OR PROTEIN IN URINE: ICD-10-CM

## 2021-05-03 DIAGNOSIS — Z34.00 SUPERVISION OF NORMAL FIRST PREGNANCY, ANTEPARTUM: Primary | ICD-10-CM

## 2021-05-03 PROCEDURE — 99213 OFFICE O/P EST LOW 20 MIN: CPT | Performed by: NURSE PRACTITIONER

## 2021-05-03 NOTE — PROGRESS NOTES
Chief Complaint   Patient presents with   • Routine Prenatal Visit     cc: ob check and growth US, anemia - iron , more samples given today . no complaints good fetal movement        HPI: 21 y.o.  at 32w1d presents for routine OB visit without complaint    Growth scan today- EFW 47%. MARIA M 8.9 with 2x2 pocket.   Reports good fetal movement  Denies ctx, lof, bleeding  Continues on iron    Vitals:    21 1346   BP: 120/62   Weight: 89.4 kg (197 lb)       Review of systems:     Gen: negative  CV:     negative  GI: negative  :   good fetal movement noted   MS:    negative  Neuro: negative  Pul: negative      A/P  1. Intrauterine pregnancy at 32w1d   2. Pregnancy Risk:  NORMAL        Diagnoses and all orders for this visit:    1. Supervision of normal first pregnancy, antepartum (Primary)    2. Screening for blood or protein in urine  -     POC Urinalysis Dipstick         labor was discussed.  Warnings were provided.      -----------------------  PLAN:   No follow-ups on file.      Caitlin Mcdonald, APRN  5/3/2021 15:05 EDT

## 2021-05-05 ENCOUNTER — TELEPHONE (OUTPATIENT)
Dept: OBSTETRICS AND GYNECOLOGY | Age: 21
End: 2021-05-05

## 2021-05-05 PROBLEM — O28.8 AFI (AMNIOTIC FLUID INDEX) BORDERLINE LOW: Status: ACTIVE | Noted: 2021-05-05

## 2021-05-18 ENCOUNTER — ROUTINE PRENATAL (OUTPATIENT)
Dept: OBSTETRICS AND GYNECOLOGY | Age: 21
End: 2021-05-18

## 2021-05-18 VITALS — WEIGHT: 199 LBS | BODY MASS INDEX: 35.25 KG/M2 | DIASTOLIC BLOOD PRESSURE: 70 MMHG | SYSTOLIC BLOOD PRESSURE: 120 MMHG

## 2021-05-18 DIAGNOSIS — Z34.03 ENCOUNTER FOR PRENATAL CARE IN THIRD TRIMESTER OF FIRST PREGNANCY: Primary | ICD-10-CM

## 2021-05-18 DIAGNOSIS — O26.893 RH NEGATIVE STATUS DURING PREGNANCY IN THIRD TRIMESTER: ICD-10-CM

## 2021-05-18 DIAGNOSIS — Z13.89 SCREENING FOR BLOOD OR PROTEIN IN URINE: ICD-10-CM

## 2021-05-18 DIAGNOSIS — O26.13 LOW WEIGHT GAIN DURING PREGNANCY IN THIRD TRIMESTER: ICD-10-CM

## 2021-05-18 DIAGNOSIS — O09.899 RUBELLA NON-IMMUNE STATUS, ANTEPARTUM: ICD-10-CM

## 2021-05-18 DIAGNOSIS — O28.8 AFI (AMNIOTIC FLUID INDEX) BORDERLINE LOW: ICD-10-CM

## 2021-05-18 DIAGNOSIS — Z67.91 RH NEGATIVE STATUS DURING PREGNANCY IN THIRD TRIMESTER: ICD-10-CM

## 2021-05-18 DIAGNOSIS — Z28.39 RUBELLA NON-IMMUNE STATUS, ANTEPARTUM: ICD-10-CM

## 2021-05-18 DIAGNOSIS — Z28.39 MATERNAL VARICELLA, NON-IMMUNE: ICD-10-CM

## 2021-05-18 DIAGNOSIS — O09.899 MATERNAL VARICELLA, NON-IMMUNE: ICD-10-CM

## 2021-05-18 DIAGNOSIS — O99.019 MATERNAL ANEMIA IN PREGNANCY, ANTEPARTUM: ICD-10-CM

## 2021-05-18 LAB
BILIRUB BLD-MCNC: NEGATIVE MG/DL
ERYTHROCYTE [DISTWIDTH] IN BLOOD BY AUTOMATED COUNT: 14 % (ref 12.3–15.4)
GLUCOSE UR STRIP-MCNC: NEGATIVE MG/DL
HCT VFR BLD AUTO: 32 % (ref 34–46.6)
HGB BLD-MCNC: 10.6 G/DL (ref 12–15.9)
KETONES UR QL: ABNORMAL
LEUKOCYTE EST, POC: ABNORMAL
MCH RBC QN AUTO: 29.8 PG (ref 26.6–33)
MCHC RBC AUTO-ENTMCNC: 33.1 G/DL (ref 31.5–35.7)
MCV RBC AUTO: 89.9 FL (ref 79–97)
NITRITE UR-MCNC: NEGATIVE MG/ML
PH UR: 8.5 [PH] (ref 5–8)
PLATELET # BLD AUTO: 242 10*3/MM3 (ref 140–450)
PROT UR STRIP-MCNC: ABNORMAL MG/DL
RBC # BLD AUTO: 3.56 10*6/MM3 (ref 3.77–5.28)
RBC # UR STRIP: NEGATIVE /UL
SP GR UR: 1.02 (ref 1–1.03)
UROBILINOGEN UR QL: ABNORMAL
WBC # BLD AUTO: 9.7 10*3/MM3 (ref 3.4–10.8)

## 2021-05-18 PROCEDURE — 99213 OFFICE O/P EST LOW 20 MIN: CPT | Performed by: OBSTETRICS & GYNECOLOGY

## 2021-05-18 NOTE — PROGRESS NOTES
Chief Complaint   Patient presents with   • Routine Prenatal Visit     cc; ob check , reports no ob complaints today , good fetal movement , pt is almost out of iron supplements -samples and we dont have any in the office        HPI: 21 y.o.  at 34w2d presents for prenatal care     Vitals:    21 1134   BP: 120/70   Weight: 90.3 kg (199 lb)       Review of systems:     Gen: negative  CV:     negative  GI: negative  :   negative and good fetal movement noted   MS:    negative  Neuro: negative  Pul: negative      A/P  1. Intrauterine pregnancy at 34w2d   2. Pregnancy Risk:  HIGH RISK        Diagnoses and all orders for this visit:    1. Encounter for prenatal care in third trimester of first pregnancy (Primary)    2. Screening for blood or protein in urine  -     POC Urinalysis Dipstick    3. MARIA M (amniotic fluid index) borderline low    4. Low weight gain during pregnancy in third trimester    5. Maternal varicella, non-immune    6. Rh negative status during pregnancy in third trimester    7. Rubella non-immune status, antepartum    8. Maternal anemia in pregnancy, antepartum  -     CBC (No Diff)        Pre-eclampsia symptoms were discussed and warnings were given.   labor was discussed.  Warnings were provided.      -----------------------  PLAN:   Return in about 2 weeks (around 2021), or ob check with growth US.  Anemia- cont Iron - check CBC today   Low weight gain - check growth two weeks  Rh neg - s/p rhogam   PTL warnings   GBS next visit      Skylar Singh MD  2021 11:57 EDT

## 2021-06-02 ENCOUNTER — ROUTINE PRENATAL (OUTPATIENT)
Dept: OBSTETRICS AND GYNECOLOGY | Age: 21
End: 2021-06-02

## 2021-06-02 VITALS — DIASTOLIC BLOOD PRESSURE: 60 MMHG | WEIGHT: 204 LBS | BODY MASS INDEX: 36.14 KG/M2 | SYSTOLIC BLOOD PRESSURE: 102 MMHG

## 2021-06-02 DIAGNOSIS — Z3A.36 36 WEEKS GESTATION OF PREGNANCY: Primary | ICD-10-CM

## 2021-06-02 LAB
BILIRUB BLD-MCNC: NEGATIVE MG/DL
CLARITY, POC: CLEAR
COLOR UR: YELLOW
GLUCOSE UR STRIP-MCNC: NEGATIVE MG/DL
KETONES UR QL: NEGATIVE
LEUKOCYTE EST, POC: ABNORMAL
NITRITE UR-MCNC: NEGATIVE MG/ML
PH UR: 7 [PH] (ref 5–8)
PROT UR STRIP-MCNC: NEGATIVE MG/DL
RBC # UR STRIP: NEGATIVE /UL
SP GR UR: 1.01 (ref 1–1.03)
UROBILINOGEN UR QL: NORMAL

## 2021-06-02 PROCEDURE — 99213 OFFICE O/P EST LOW 20 MIN: CPT | Performed by: NURSE PRACTITIONER

## 2021-06-02 NOTE — PROGRESS NOTES
Chief Complaint   Patient presents with   • Routine Prenatal Visit       HPI: 21 y.o.  at 36w3d presents for routine OB visit     No complaints, feeling well  Growth scan yesterday- EFW 37%. MARIA M 12. Breech.  Denies ctx, lof, bleeding  Reports good fetal movement.  GBS today    Vitals:    21 1153   BP: 102/60   Weight: 92.5 kg (204 lb)       Review of systems:     Gen: negative  CV:     negative  GI: negative  :   negative and good fetal movement noted   MS:    negative  Neuro: denies headaches and visual changes   Pul: negative      A/P  1. Intrauterine pregnancy at 36w3d   2. Pregnancy Risk:  COMPLICATED        Diagnoses and all orders for this visit:    1. 36 weeks gestation of pregnancy (Primary)  -     POC Urinalysis Dipstick        Pre-eclampsia symptoms were discussed and warnings were given.   labor was discussed.  Warnings were provided.  [facility] encouranged    -----------------------  PLAN:     1 week OB visit  Call for changes or concerns    Caitlin Mcdonald, APRBRAYAN  2021 12:08 EDT

## 2021-06-03 ENCOUNTER — HOSPITAL ENCOUNTER (EMERGENCY)
Facility: HOSPITAL | Age: 21
Discharge: HOME | End: 2021-06-03
Attending: OBSTETRICS & GYNECOLOGY | Admitting: OBSTETRICS & GYNECOLOGY

## 2021-06-03 VITALS
TEMPERATURE: 98.3 F | BODY MASS INDEX: 34.86 KG/M2 | SYSTOLIC BLOOD PRESSURE: 118 MMHG | HEIGHT: 64 IN | HEART RATE: 79 BPM | RESPIRATION RATE: 16 BRPM | OXYGEN SATURATION: 99 % | WEIGHT: 204.2 LBS | DIASTOLIC BLOOD PRESSURE: 63 MMHG

## 2021-06-03 PROBLEM — O99.013 ANEMIA COMPLICATING PREGNANCY, THIRD TRIMESTER: Status: ACTIVE | Noted: 2021-06-03

## 2021-06-03 LAB
ALBUMIN SERPL-MCNC: 3.5 G/DL (ref 3.5–5.2)
ALBUMIN/GLOB SERPL: 1.2 G/DL
ALP SERPL-CCNC: 135 U/L (ref 39–117)
ALT SERPL W P-5'-P-CCNC: 8 U/L (ref 1–33)
ANION GAP SERPL CALCULATED.3IONS-SCNC: 9 MMOL/L (ref 5–15)
AST SERPL-CCNC: 13 U/L (ref 1–32)
BACTERIA UR QL AUTO: ABNORMAL /HPF
BASOPHILS # BLD AUTO: 0.02 10*3/MM3 (ref 0–0.2)
BASOPHILS NFR BLD AUTO: 0.2 % (ref 0–1.5)
BILIRUB SERPL-MCNC: 0.7 MG/DL (ref 0–1.2)
BILIRUB UR QL STRIP: NEGATIVE
BUN SERPL-MCNC: 5 MG/DL (ref 6–20)
BUN/CREAT SERPL: 9.4 (ref 7–25)
CALCIUM SPEC-SCNC: 8.9 MG/DL (ref 8.6–10.5)
CHLORIDE SERPL-SCNC: 105 MMOL/L (ref 98–107)
CLARITY UR: CLEAR
CO2 SERPL-SCNC: 23 MMOL/L (ref 22–29)
COLOR UR: YELLOW
CREAT SERPL-MCNC: 0.53 MG/DL (ref 0.57–1)
CREAT UR-MCNC: 80.3 MG/DL
DEPRECATED RDW RBC AUTO: 45.5 FL (ref 37–54)
EOSINOPHIL # BLD AUTO: 0.03 10*3/MM3 (ref 0–0.4)
EOSINOPHIL NFR BLD AUTO: 0.3 % (ref 0.3–6.2)
ERYTHROCYTE [DISTWIDTH] IN BLOOD BY AUTOMATED COUNT: 14.4 % (ref 12.3–15.4)
GFR SERPL CREATININE-BSD FRML MDRD: 144 ML/MIN/1.73
GLOBULIN UR ELPH-MCNC: 2.9 GM/DL
GLUCOSE SERPL-MCNC: 95 MG/DL (ref 65–99)
GLUCOSE UR STRIP-MCNC: NEGATIVE MG/DL
HCT VFR BLD AUTO: 30.7 % (ref 34–46.6)
HGB BLD-MCNC: 10.3 G/DL (ref 12–15.9)
HGB UR QL STRIP.AUTO: NEGATIVE
HYALINE CASTS UR QL AUTO: ABNORMAL /LPF
IMM GRANULOCYTES # BLD AUTO: 0.07 10*3/MM3 (ref 0–0.05)
IMM GRANULOCYTES NFR BLD AUTO: 0.7 % (ref 0–0.5)
KETONES UR QL STRIP: ABNORMAL
LEUKOCYTE ESTERASE UR QL STRIP.AUTO: ABNORMAL
LYMPHOCYTES # BLD AUTO: 1.07 10*3/MM3 (ref 0.7–3.1)
LYMPHOCYTES NFR BLD AUTO: 11 % (ref 19.6–45.3)
MCH RBC QN AUTO: 29.3 PG (ref 26.6–33)
MCHC RBC AUTO-ENTMCNC: 33.6 G/DL (ref 31.5–35.7)
MCV RBC AUTO: 87.5 FL (ref 79–97)
MONOCYTES # BLD AUTO: 0.61 10*3/MM3 (ref 0.1–0.9)
MONOCYTES NFR BLD AUTO: 6.3 % (ref 5–12)
NEUTROPHILS NFR BLD AUTO: 7.93 10*3/MM3 (ref 1.7–7)
NEUTROPHILS NFR BLD AUTO: 81.5 % (ref 42.7–76)
NITRITE UR QL STRIP: NEGATIVE
NRBC BLD AUTO-RTO: 0 /100 WBC (ref 0–0.2)
PH UR STRIP.AUTO: 6.5 [PH] (ref 5–8)
PLATELET # BLD AUTO: 258 10*3/MM3 (ref 140–450)
PMV BLD AUTO: 9.5 FL (ref 6–12)
POTASSIUM SERPL-SCNC: 3.6 MMOL/L (ref 3.5–5.2)
PROT SERPL-MCNC: 6.4 G/DL (ref 6–8.5)
PROT UR QL STRIP: NEGATIVE
PROT UR-MCNC: 10 MG/DL
PROT/CREAT UR: 124.5 MG/G CREA (ref 0–200)
RBC # BLD AUTO: 3.51 10*6/MM3 (ref 3.77–5.28)
RBC # UR: ABNORMAL /HPF
REF LAB TEST METHOD: ABNORMAL
SODIUM SERPL-SCNC: 137 MMOL/L (ref 136–145)
SP GR UR STRIP: 1.01 (ref 1–1.03)
SQUAMOUS #/AREA URNS HPF: ABNORMAL /HPF
UROBILINOGEN UR QL STRIP: ABNORMAL
WBC # BLD AUTO: 9.73 10*3/MM3 (ref 3.4–10.8)
WBC UR QL AUTO: ABNORMAL /HPF

## 2021-06-03 PROCEDURE — 84156 ASSAY OF PROTEIN URINE: CPT | Performed by: OBSTETRICS & GYNECOLOGY

## 2021-06-03 PROCEDURE — 59025 FETAL NON-STRESS TEST: CPT

## 2021-06-03 PROCEDURE — 85025 COMPLETE CBC W/AUTO DIFF WBC: CPT | Performed by: OBSTETRICS & GYNECOLOGY

## 2021-06-03 PROCEDURE — 80053 COMPREHEN METABOLIC PANEL: CPT | Performed by: OBSTETRICS & GYNECOLOGY

## 2021-06-03 PROCEDURE — 82570 ASSAY OF URINE CREATININE: CPT | Performed by: OBSTETRICS & GYNECOLOGY

## 2021-06-03 PROCEDURE — 81001 URINALYSIS AUTO W/SCOPE: CPT | Performed by: OBSTETRICS & GYNECOLOGY

## 2021-06-03 PROCEDURE — 99284 EMERGENCY DEPT VISIT MOD MDM: CPT | Performed by: OBSTETRICS & GYNECOLOGY

## 2021-06-03 RX ORDER — PRENATAL VIT/IRON FUM/FOLIC AC 27MG-0.8MG
1 TABLET ORAL DAILY
COMMUNITY

## 2021-06-03 RX ORDER — ONDANSETRON 4 MG/1
4 TABLET, FILM COATED ORAL EVERY 8 HOURS PRN
COMMUNITY
End: 2021-06-25 | Stop reason: HOSPADM

## 2021-06-03 NOTE — OBED NOTES
[facility] Note OB        Patient Name: Madie Givens  YOB: 2000  MRN: [id number]  Admission Date: 6/3/2021  4:10 PM  Date of Service: 6/3/2021    Chief Complaint: Elevated Blood Pressure (patient presents to labor and delivery at 36 weeks gestation woth complaints of increased blood pressure today at work)        Subjective     Madie Givens is a 21 y.o. female  at 36w4d with Estimated Date of Delivery: 21 who presents with the chief complaint listed above.  She sees Skylar Singh MD for her prenatal care. Her pregnancy has been uncomplicated to this point by patient report. Patient reports that at work her BP was 150's/90's.  She denies head ache, visual disturbance, SOB, RUQ pain, or edema.   She feels normal fetal movement.  She denies loss of fluid or vaginal bleeding. She denies feeling contractions.    She denies fever, cough, cold or flu symptoms, SOB, or change in taste or smell.            Objective   Patient Active Problem List    Diagnosis    • Anemia complicating pregnancy, third trimester [O99.013]         OB History    Para Term  AB Living   1 0 0 0 0 0   SAB TAB Ectopic Molar Multiple Live Births   0 0 0 0 0 0      # Outcome Date GA Lbr Galo/2nd Weight Sex Delivery Anes PTL Lv   1 Current                 History reviewed. No pertinent past medical history.    History reviewed. No pertinent surgical history.    No current facility-administered medications on file prior to encounter.     Current Outpatient Medications on File Prior to Encounter   Medication Sig Dispense Refill   • ondansetron (ZOFRAN) 4 MG tablet Take 4 mg by mouth Every 8 (Eight) Hours As Needed for Nausea or Vomiting.     • Prenatal Vit-Fe Fumarate-FA (prenatal vitamin 27-0.8) 27-0.8 MG tablet tablet Take 1 tablet by mouth Daily.         No Known Allergies    History reviewed. No pertinent family history.    Social History     Socioeconomic History   • Marital status: Single     Spouse name:  Not on file   • Number of children: Not on file   • Years of education: Not on file   • Highest education level: Not on file   Tobacco Use   • Smoking status: Never Smoker   • Smokeless tobacco: Never Used   Substance and Sexual Activity   • Alcohol use: Never   • Drug use: Never   • Sexual activity: Not Currently           Review of Systems   Constitutional: Negative for chills and fever.   HENT: Negative.    Eyes: Negative for photophobia and visual disturbance.   Respiratory: Negative for cough, chest tightness and shortness of breath.    Cardiovascular: Negative for leg swelling.   Gastrointestinal: Negative for abdominal pain, diarrhea, nausea and vomiting.   Genitourinary: Negative for dysuria, flank pain, hematuria, pelvic pain, vaginal bleeding and vaginal discharge.   Musculoskeletal: Negative for back pain.   Neurological: Negative for dizziness, weakness and headaches.      Normal fetal movement    PHYSICAL EXAM:      VITAL SIGNS:  Vitals:    06/03/21 1646 06/03/21 1700 06/03/21 1701 06/03/21 1716   BP: 127/65 116/66 116/66 114/64   BP Location:       Patient Position:       Pulse: 82 81 81 83   Resp:       Temp:       TempSrc:       SpO2:       Weight:       Height:            FHT'S:                   Baseline:  150 BPM  Variability:  Moderate = 6 - 25 BPM  Accelerations:  15 x 15 accelerations present     Decelerations:  absent  Contractions:   present     Interpretation:   Reactive NST, CAT 1 tracing        PHYSICAL EXAM:    General: well developed; well nourished  no acute distress   Heart: Not performed.   Lungs: breathing is unlabored     Abdomen: soft, non-tender; no masses  no umbilical or inguinal hernias are present       Cervix: Exam by nurse  Cervical Dilation (cm): 1  Cervical Effacement: 70%  Fetal Station: -3  Cervical Consistency: medium  Cervical Position: posterior   Contractions: none        Extremities: peripheral pulses normal, no pedal edema, no clubbing or cyanosis      LABS AND  TESTING ORDERED:  1. Uterine and fetal monitoring  2. Urinalysis  3. CBC, CMP, urine protein to creatinine ratio    LAB RESULTS:    Recent Results (from the past 24 hour(s))   Comprehensive Metabolic Panel    Collection Time: 06/03/21  4:37 PM    Specimen: Blood   Result Value Ref Range    Glucose 95 65 - 99 mg/dL    BUN 5 (L) 6 - 20 mg/dL    Creatinine 0.53 (L) 0.57 - 1.00 mg/dL    Sodium 137 136 - 145 mmol/L    Potassium 3.6 3.5 - 5.2 mmol/L    Chloride 105 98 - 107 mmol/L    CO2 23.0 22.0 - 29.0 mmol/L    Calcium 8.9 8.6 - 10.5 mg/dL    Total Protein 6.4 6.0 - 8.5 g/dL    Albumin 3.50 3.50 - 5.20 g/dL    ALT (SGPT) 8 1 - 33 U/L    AST (SGOT) 13 1 - 32 U/L    Alkaline Phosphatase 135 (H) 39 - 117 U/L    Total Bilirubin 0.7 0.0 - 1.2 mg/dL    eGFR Non African Amer 144 >60 mL/min/1.73    Globulin 2.9 gm/dL    A/G Ratio 1.2 g/dL    BUN/Creatinine Ratio 9.4 7.0 - 25.0    Anion Gap 9.0 5.0 - 15.0 mmol/L   Protein / Creatinine Ratio, Urine - Urine, Clean Catch    Collection Time: 06/03/21  4:37 PM    Specimen: Urine, Clean Catch   Result Value Ref Range    Protein/Creatinine Ratio, Urine 124.5 0.0 - 200.0 mg/G Crea    Creatinine, Urine 80.3 mg/dL    Total Protein, Urine 10.0 mg/dL   CBC Auto Differential    Collection Time: 06/03/21  4:37 PM    Specimen: Blood   Result Value Ref Range    WBC 9.73 3.40 - 10.80 10*3/mm3    RBC 3.51 (L) 3.77 - 5.28 10*6/mm3    Hemoglobin 10.3 (L) 12.0 - 15.9 g/dL    Hematocrit 30.7 (L) 34.0 - 46.6 %    MCV 87.5 79.0 - 97.0 fL    MCH 29.3 26.6 - 33.0 pg    MCHC 33.6 31.5 - 35.7 g/dL    RDW 14.4 12.3 - 15.4 %    RDW-SD 45.5 37.0 - 54.0 fl    MPV 9.5 6.0 - 12.0 fL    Platelets 258 140 - 450 10*3/mm3    Neutrophil % 81.5 (H) 42.7 - 76.0 %    Lymphocyte % 11.0 (L) 19.6 - 45.3 %    Monocyte % 6.3 5.0 - 12.0 %    Eosinophil % 0.3 0.3 - 6.2 %    Basophil % 0.2 0.0 - 1.5 %    Immature Grans % 0.7 (H) 0.0 - 0.5 %    Neutrophils, Absolute 7.93 (H) 1.70 - 7.00 10*3/mm3    Lymphocytes, Absolute 1.07  0.70 - 3.10 10*3/mm3    Monocytes, Absolute 0.61 0.10 - 0.90 10*3/mm3    Eosinophils, Absolute 0.03 0.00 - 0.40 10*3/mm3    Basophils, Absolute 0.02 0.00 - 0.20 10*3/mm3    Immature Grans, Absolute 0.07 (H) 0.00 - 0.05 10*3/mm3    nRBC 0.0 0.0 - 0.2 /100 WBC   Urinalysis With Culture If Indicated - Urine, Clean Catch    Collection Time: 21  4:37 PM    Specimen: Urine, Clean Catch   Result Value Ref Range    Color, UA Yellow Yellow, Straw    Appearance, UA Clear Clear    pH, UA 6.5 5.0 - 8.0    Specific Gravity, UA 1.013 1.005 - 1.030    Glucose, UA Negative Negative    Ketones, UA 15 mg/dL (1+) (A) Negative    Bilirubin, UA Negative Negative    Blood, UA Negative Negative    Protein, UA Negative Negative    Leuk Esterase, UA Trace (A) Negative    Nitrite, UA Negative Negative    Urobilinogen, UA 1.0 E.U./dL 0.2 - 1.0 E.U./dL   Urinalysis, Microscopic Only - Urine, Clean Catch    Collection Time: 21  4:37 PM    Specimen: Urine, Clean Catch   Result Value Ref Range    RBC, UA 0-2 None Seen, 0-2 /HPF    WBC, UA 3-5 (A) None Seen, 0-2 /HPF    Bacteria, UA None Seen None Seen /HPF    Squamous Epithelial Cells, UA 3-6 (A) None Seen, 0-2 /HPF    Hyaline Casts, UA 0-2 None Seen /LPF    Methodology Automated Microscopy        No results found for: ABO, RH    No results found for: STREPGPB    Lab Results   Component Value Date    HGB 10.3 (L) 2021    HCT 30.7 (L) 2021     2021    CREATININE 0.53 (L) 2021    AST 13 2021    ALT 8 2021    UTPCR 124.5 2021           Assessment/Plan   ASSESSMENT/PLAN:  Madie Givens is a 21 y.o. female  at 36w4d who presented secondary to elevated blood pressure were in the 150s over 90s.  She has no symptoms of preeclampsia, denying headache, visual disturbance, shortness of breath, right upper quadrant pain, or increased edema.  While the patient was here she had no blood pressure greater than 124/79.  Labs for preeclampsia were  drawn and returned with normal values.  She has had anemia and continues to have a hemoglobin of 10.3 with hematocrit of 30.7 with platelets remaining normal at 258,000.  No to have a normal serum creatinine of 0.53 with a normal AST of 13 ALT of 8.  Urine protein to creatinine ratio is noted to be 124.5 which is normal.  No evidence for preeclampsia or gestational hypertension at this time.  She was noted to have mild and irregular contractions on the monitor and her cervix was noted to be about 1 cm dilated about 70% effaced.  Fetus is noted to have a reactive NST and category 1 tracing.. In view of reactive NST reassuring fetal status, no evidence for active labor, and no evidence of preeclampsia or gestational hypertension during visit, possible gestational hypertension based on BP at work.  Has been encouraged to monitor her blood pressure at home and to follow-up as needed should she develop headache visual disturbance or increased swelling.          Final Impression:  • Pregnancy at 36w4d  •    • Patient seen and evaluated secondary to elevated blood pressure at work 150/90, all blood pressures here were less than 124/79 with negative preeclamptic labs.  Patient has had anemia and continues to have mild anemia with a hemoglobin of 10.3 but no significant change in hemoglobin. Possible gestational hypertension but no evidence of pre eclampsia at this time  • Urinalysis unremarkable with no evidence of UTI, she does have a small amount of ketonuria and its been encouraged to eat more frequent small meals.  • Patient noted to have mild irregular contractions on the monitor but she is not feeling these she is 1 cm dilated/70% effaced vertex -3  • Reactive NST, CAT   1 tracing, Reassuring fetal status    • Maternal vital signs were reviewed and were unremarkable                   Vitals:    21 1646 21 1700 21 1701 21 1716   BP: 127/65 116/66 116/66 114/64   BP Location:       Patient  Position:       Pulse: 82 81 81 83   Resp:       Temp:       TempSrc:       SpO2:       Weight:       Height:             • COVID - 19 status unknown  • She will be discharged to home     PLAN:  • Discharge to home  • She will continue her home meds       Your medication list      CONTINUE taking these medications      Instructions Last Dose Given Next Dose Due   ondansetron 4 MG tablet  Commonly known as: ZOFRAN      Take 4 mg by mouth Every 8 (Eight) Hours As Needed for Nausea or Vomiting.       prenatal vitamin 27-0.8 27-0.8 MG tablet tablet      Take 1 tablet by mouth Daily.                1. She will follow up with Skylar Singh MD as scheduled and as needed  2. She has been instructed to return for contractions, vaginal bleeding, Rupture of membranes, decreased fetal movement or other concern  3. She may call the office or [facility] with questions.           I have spent 45 minutes including face to face time with the patient, greater than 50% in discussion of the diagnosis (counseling) and/or coordination of care.         Jaswinder Cortes MD  6/3/2021  17:31 EDT  OB Hospitalist  Phone:  [phone]

## 2021-06-06 LAB — B-HEM STREP SPEC QL CULT: NEGATIVE

## 2021-06-08 ENCOUNTER — ROUTINE PRENATAL (OUTPATIENT)
Dept: OBSTETRICS AND GYNECOLOGY | Age: 21
End: 2021-06-08

## 2021-06-08 ENCOUNTER — PREP FOR SURGERY (OUTPATIENT)
Dept: OTHER | Facility: HOSPITAL | Age: 21
End: 2021-06-08

## 2021-06-08 VITALS — WEIGHT: 201 LBS | BODY MASS INDEX: 34.5 KG/M2 | SYSTOLIC BLOOD PRESSURE: 130 MMHG | DIASTOLIC BLOOD PRESSURE: 62 MMHG

## 2021-06-08 DIAGNOSIS — Z28.39 RUBELLA NON-IMMUNE STATUS, ANTEPARTUM: ICD-10-CM

## 2021-06-08 DIAGNOSIS — Z34.03 ENCOUNTER FOR PRENATAL CARE IN THIRD TRIMESTER OF FIRST PREGNANCY: Primary | ICD-10-CM

## 2021-06-08 DIAGNOSIS — Z67.91 RH NEGATIVE STATUS DURING PREGNANCY IN THIRD TRIMESTER: ICD-10-CM

## 2021-06-08 DIAGNOSIS — O99.019 MATERNAL ANEMIA IN PREGNANCY, ANTEPARTUM: ICD-10-CM

## 2021-06-08 DIAGNOSIS — O09.899 RUBELLA NON-IMMUNE STATUS, ANTEPARTUM: ICD-10-CM

## 2021-06-08 DIAGNOSIS — O26.893 RH NEGATIVE STATUS DURING PREGNANCY IN THIRD TRIMESTER: ICD-10-CM

## 2021-06-08 DIAGNOSIS — O26.13 LOW WEIGHT GAIN DURING PREGNANCY IN THIRD TRIMESTER: ICD-10-CM

## 2021-06-08 DIAGNOSIS — Z28.39 MATERNAL VARICELLA, NON-IMMUNE: ICD-10-CM

## 2021-06-08 DIAGNOSIS — O09.899 MATERNAL VARICELLA, NON-IMMUNE: ICD-10-CM

## 2021-06-08 DIAGNOSIS — O99.210 OBESITY IN PREGNANCY, ANTEPARTUM: ICD-10-CM

## 2021-06-08 DIAGNOSIS — Z13.89 SCREENING FOR BLOOD OR PROTEIN IN URINE: ICD-10-CM

## 2021-06-08 PROBLEM — O99.013 ANEMIA COMPLICATING PREGNANCY, THIRD TRIMESTER: Status: RESOLVED | Noted: 2021-06-03 | Resolved: 2021-06-08

## 2021-06-08 PROBLEM — O28.8 AFI (AMNIOTIC FLUID INDEX) BORDERLINE LOW: Status: RESOLVED | Noted: 2021-05-05 | Resolved: 2021-06-08

## 2021-06-08 PROCEDURE — 99213 OFFICE O/P EST LOW 20 MIN: CPT | Performed by: OBSTETRICS & GYNECOLOGY

## 2021-06-08 RX ORDER — SODIUM CHLORIDE 0.9 % (FLUSH) 0.9 %
3 SYRINGE (ML) INJECTION EVERY 12 HOURS SCHEDULED
Status: CANCELLED | OUTPATIENT
Start: 2021-06-08

## 2021-06-08 RX ORDER — ONDANSETRON 2 MG/ML
4 INJECTION INTRAMUSCULAR; INTRAVENOUS EVERY 6 HOURS PRN
Status: CANCELLED | OUTPATIENT
Start: 2021-06-08

## 2021-06-08 RX ORDER — ONDANSETRON 4 MG/1
4 TABLET, FILM COATED ORAL EVERY 6 HOURS PRN
Status: CANCELLED | OUTPATIENT
Start: 2021-06-08

## 2021-06-08 RX ORDER — SODIUM CHLORIDE, SODIUM LACTATE, POTASSIUM CHLORIDE, CALCIUM CHLORIDE 600; 310; 30; 20 MG/100ML; MG/100ML; MG/100ML; MG/100ML
125 INJECTION, SOLUTION INTRAVENOUS CONTINUOUS
Status: CANCELLED | OUTPATIENT
Start: 2021-06-08

## 2021-06-08 RX ORDER — LIDOCAINE HYDROCHLORIDE 10 MG/ML
5 INJECTION, SOLUTION EPIDURAL; INFILTRATION; INTRACAUDAL; PERINEURAL AS NEEDED
Status: CANCELLED | OUTPATIENT
Start: 2021-06-08

## 2021-06-08 RX ORDER — SODIUM CHLORIDE 0.9 % (FLUSH) 0.9 %
10 SYRINGE (ML) INJECTION AS NEEDED
Status: CANCELLED | OUTPATIENT
Start: 2021-06-08

## 2021-06-08 RX ORDER — OXYTOCIN-SODIUM CHLORIDE 0.9% IV SOLN 30 UNIT/500ML 30-0.9/5 UT/ML-%
125 SOLUTION INTRAVENOUS CONTINUOUS PRN
Status: CANCELLED | OUTPATIENT
Start: 2021-06-08

## 2021-06-08 RX ORDER — OXYTOCIN-SODIUM CHLORIDE 0.9% IV SOLN 30 UNIT/500ML 30-0.9/5 UT/ML-%
999 SOLUTION INTRAVENOUS ONCE
Status: CANCELLED | OUTPATIENT
Start: 2021-06-08 | End: 2021-06-08

## 2021-06-08 RX ORDER — OXYTOCIN-SODIUM CHLORIDE 0.9% IV SOLN 30 UNIT/500ML 30-0.9/5 UT/ML-%
250 SOLUTION INTRAVENOUS CONTINUOUS PRN
Status: CANCELLED | OUTPATIENT
Start: 2021-06-08 | End: 2021-06-08

## 2021-06-08 RX ORDER — CARBOPROST TROMETHAMINE 250 UG/ML
250 INJECTION, SOLUTION INTRAMUSCULAR AS NEEDED
Status: CANCELLED | OUTPATIENT
Start: 2021-06-08

## 2021-06-08 RX ORDER — CEFAZOLIN SODIUM 2 G/100ML
2 INJECTION, SOLUTION INTRAVENOUS ONCE
Status: CANCELLED | OUTPATIENT
Start: 2021-06-08 | End: 2021-06-08

## 2021-06-08 RX ORDER — METHYLERGONOVINE MALEATE 0.2 MG/ML
200 INJECTION INTRAVENOUS AS NEEDED
Status: CANCELLED | OUTPATIENT
Start: 2021-06-08

## 2021-06-08 NOTE — PROGRESS NOTES
Chief Complaint   Patient presents with   • Routine Prenatal Visit     cc: ob check , no complaints        HPI: 21 y.o.  at 37w2d presents for prenatal care      Vitals:    21 1018   BP: 130/62   Weight: 91.2 kg (201 lb)       Review of systems:     Gen: negative  CV:     negative  GI: negative  :   negative and good fetal movement noted   MS:    negative  Neuro: negative  Pul: negative      A/P  1. Intrauterine pregnancy at 37w2d   2. Pregnancy Risk:  HIGH RISK        Diagnoses and all orders for this visit:    1. Encounter for prenatal care in third trimester of first pregnancy (Primary)    2. Screening for blood or protein in urine  -     POC Urinalysis Dipstick    3. Low weight gain during pregnancy in third trimester    4. Maternal care for breech presentation, single gestation  -     Case Request    5. Maternal varicella, non-immune    6. Obesity in pregnancy, antepartum    7. Rh negative status during pregnancy in third trimester    8. Maternal anemia in pregnancy, antepartum    9. Rubella non-immune status, antepartum    Other orders  -     Cancel: Group B Streptococcus Culture - Swab, Vaginal/Rectum        Routine labor warnings were discussed and indications for L & D f/u including bleeding, regular contractions, decreased fetal movement or/and rupture of membranes.   Detailed instructions for  were provided      -----------------------  PLAN:   Return in about 1 week (around 6/15/2021), or ob check with me and BPP.  Previous borderline low MARIA M - recheck today   Breech - declines ECV - primary csection scheduled at 39-3 weeks  Labor warnings/FKC   1 week       Skylar Singh MD  2021 10:59 EDT

## 2021-06-15 ENCOUNTER — ROUTINE PRENATAL (OUTPATIENT)
Dept: OBSTETRICS AND GYNECOLOGY | Age: 21
End: 2021-06-15

## 2021-06-15 VITALS — DIASTOLIC BLOOD PRESSURE: 70 MMHG | SYSTOLIC BLOOD PRESSURE: 118 MMHG | BODY MASS INDEX: 35.02 KG/M2 | WEIGHT: 204 LBS

## 2021-06-15 DIAGNOSIS — Z13.89 SCREENING FOR BLOOD OR PROTEIN IN URINE: ICD-10-CM

## 2021-06-15 DIAGNOSIS — O26.893 RH NEGATIVE STATUS DURING PREGNANCY IN THIRD TRIMESTER: ICD-10-CM

## 2021-06-15 DIAGNOSIS — O99.019 MATERNAL ANEMIA IN PREGNANCY, ANTEPARTUM: ICD-10-CM

## 2021-06-15 DIAGNOSIS — Z34.03 ENCOUNTER FOR PRENATAL CARE IN THIRD TRIMESTER OF FIRST PREGNANCY: Primary | ICD-10-CM

## 2021-06-15 DIAGNOSIS — Z67.91 RH NEGATIVE STATUS DURING PREGNANCY IN THIRD TRIMESTER: ICD-10-CM

## 2021-06-15 DIAGNOSIS — Z28.39 RUBELLA NON-IMMUNE STATUS, ANTEPARTUM: ICD-10-CM

## 2021-06-15 DIAGNOSIS — O09.899 RUBELLA NON-IMMUNE STATUS, ANTEPARTUM: ICD-10-CM

## 2021-06-15 LAB
BILIRUB BLD-MCNC: NEGATIVE MG/DL
GLUCOSE UR STRIP-MCNC: NEGATIVE MG/DL
KETONES UR QL: NEGATIVE
LEUKOCYTE EST, POC: ABNORMAL
NITRITE UR-MCNC: NEGATIVE MG/ML
PH UR: 6 [PH] (ref 5–8)
PROT UR STRIP-MCNC: NEGATIVE MG/DL
RBC # UR STRIP: NEGATIVE /UL
SP GR UR: 1.01 (ref 1–1.03)
UROBILINOGEN UR QL: ABNORMAL

## 2021-06-15 PROCEDURE — 99213 OFFICE O/P EST LOW 20 MIN: CPT | Performed by: OBSTETRICS & GYNECOLOGY

## 2021-06-15 NOTE — PROGRESS NOTES
Chief Complaint   Patient presents with   • Routine Prenatal Visit     cc: ob check , last ob us 21 , gbs neg , good fetal movement,  vag pressure denies any vag bleeding or fluid loss        HPI: 21 y.o.  at 38w2d presents for prenatal care      Vitals:    06/15/21 1032   BP: 118/70   Weight: 92.5 kg (204 lb)       Review of systems:     Gen: negative  CV:     negative  GI: negative  :   good fetal movement noted  and pelvic pressure  MS:    negative  Neuro: negative  Pul: negative      A/P  1. Intrauterine pregnancy at 38w2d   2. Pregnancy Risk:  HIGH RISK        Diagnoses and all orders for this visit:    1. Encounter for prenatal care in third trimester of first pregnancy (Primary)    2. Screening for blood or protein in urine  -     POC Urinalysis Dipstick    3. Maternal care for breech presentation, single gestation    4. Maternal anemia in pregnancy, antepartum    5. Rubella non-immune status, antepartum    6. Rh negative status during pregnancy in third trimester        Routine labor warnings were discussed and indications for L & D f/u including bleeding, regular contractions, decreased fetal movement or/and rupture of membranes.       -----------------------  PLAN:   Return in about 1 week (around 2021), or ob check with me.  Breech - Primary  on    Anemia - cont Iron   Rh neg - s/p Rhogam   Labor warnings/FKC   1 week    Skylar Singh MD  6/15/2021 11:02 EDT

## 2021-06-22 ENCOUNTER — ROUTINE PRENATAL (OUTPATIENT)
Dept: OBSTETRICS AND GYNECOLOGY | Age: 21
End: 2021-06-22

## 2021-06-22 VITALS — DIASTOLIC BLOOD PRESSURE: 64 MMHG | SYSTOLIC BLOOD PRESSURE: 110 MMHG | WEIGHT: 204 LBS | BODY MASS INDEX: 35.02 KG/M2

## 2021-06-22 DIAGNOSIS — O26.893 RH NEGATIVE STATUS DURING PREGNANCY IN THIRD TRIMESTER: ICD-10-CM

## 2021-06-22 DIAGNOSIS — Z13.89 SCREENING FOR BLOOD OR PROTEIN IN URINE: ICD-10-CM

## 2021-06-22 DIAGNOSIS — Z28.39 MATERNAL VARICELLA, NON-IMMUNE: ICD-10-CM

## 2021-06-22 DIAGNOSIS — Z28.39 RUBELLA NON-IMMUNE STATUS, ANTEPARTUM: ICD-10-CM

## 2021-06-22 DIAGNOSIS — O09.899 MATERNAL VARICELLA, NON-IMMUNE: ICD-10-CM

## 2021-06-22 DIAGNOSIS — O99.019 MATERNAL ANEMIA IN PREGNANCY, ANTEPARTUM: ICD-10-CM

## 2021-06-22 DIAGNOSIS — Z34.03 ENCOUNTER FOR PRENATAL CARE IN THIRD TRIMESTER OF FIRST PREGNANCY: Primary | ICD-10-CM

## 2021-06-22 DIAGNOSIS — Z67.91 RH NEGATIVE STATUS DURING PREGNANCY IN THIRD TRIMESTER: ICD-10-CM

## 2021-06-22 DIAGNOSIS — O09.899 RUBELLA NON-IMMUNE STATUS, ANTEPARTUM: ICD-10-CM

## 2021-06-22 DIAGNOSIS — O99.210 OBESITY IN PREGNANCY, ANTEPARTUM: ICD-10-CM

## 2021-06-22 LAB
BILIRUB BLD-MCNC: NEGATIVE MG/DL
GLUCOSE UR STRIP-MCNC: NEGATIVE MG/DL
KETONES UR QL: NEGATIVE
LEUKOCYTE EST, POC: ABNORMAL
NITRITE UR-MCNC: NEGATIVE MG/ML
PH UR: 6.5 [PH] (ref 5–8)
PROT UR STRIP-MCNC: NEGATIVE MG/DL
RBC # UR STRIP: NEGATIVE /UL
SP GR UR: 1.02 (ref 1–1.03)
UROBILINOGEN UR QL: ABNORMAL

## 2021-06-22 PROCEDURE — 99213 OFFICE O/P EST LOW 20 MIN: CPT | Performed by: OBSTETRICS & GYNECOLOGY

## 2021-06-22 NOTE — PROGRESS NOTES
Chief Complaint   Patient presents with   • Routine Prenatal Visit     cc; ob check , denies any contractions , good fetal movement , nervous before csection tomorrow .        HPI: 21 y.o.  at 39w2d presents for prenatal care    Vitals:    21 0840   BP: 110/64   Weight: 92.5 kg (204 lb)       Review of systems:     Gen: negative  CV:     negative  GI: negative  :   negative and good fetal movement noted   MS:    negative  Neuro: negative  Pul: negative      A/P  1. Intrauterine pregnancy at 39w2d   2. Pregnancy Risk:  HIGH RISK        Diagnoses and all orders for this visit:    1. Encounter for prenatal care in third trimester of first pregnancy (Primary)    2. Screening for blood or protein in urine  -     POC Urinalysis Dipstick    3. Maternal care for breech presentation, single gestation    4. Maternal varicella, non-immune    5. Obesity in pregnancy, antepartum    6. Rh negative status during pregnancy in third trimester    7. Rubella non-immune status, antepartum    8. Maternal anemia in pregnancy, antepartum        Routine labor warnings were discussed and indications for L & D f/u including bleeding, regular contractions, decreased fetal movement or/and rupture of membranes.       -----------------------  PLAN:   Return in about 2 weeks (around 2021), or PP incision check.   Breech - primary  tomorrow  Detailed instructions given   RNI/VNI- Vax PP   Labor warnings/FKC       Skylar Singh MD  2021 09:01 EDT

## 2021-06-23 ENCOUNTER — HOSPITAL ENCOUNTER (INPATIENT)
Facility: HOSPITAL | Age: 21
LOS: 2 days | Discharge: HOME | End: 2021-06-25
Attending: OBSTETRICS & GYNECOLOGY | Admitting: OBSTETRICS & GYNECOLOGY

## 2021-06-23 ENCOUNTER — ANESTHESIA EVENT (OUTPATIENT)
Dept: LABOR AND DELIVERY | Facility: HOSPITAL | Age: 21
End: 2021-06-23

## 2021-06-23 ENCOUNTER — ANESTHESIA (OUTPATIENT)
Dept: LABOR AND DELIVERY | Facility: HOSPITAL | Age: 21
End: 2021-06-23

## 2021-06-23 LAB
ABO GROUP BLD: NORMAL
ABO GROUP BLD: NORMAL
BLD GP AB SCN SERPL QL: NEGATIVE
DEPRECATED RDW RBC AUTO: 46.3 FL (ref 37–54)
ERYTHROCYTE [DISTWIDTH] IN BLOOD BY AUTOMATED COUNT: 14.5 % (ref 12.3–15.4)
FETAL BLEED: NEGATIVE
HCT VFR BLD AUTO: 32.4 % (ref 34–46.6)
HGB BLD-MCNC: 10.6 G/DL (ref 12–15.9)
MCH RBC QN AUTO: 28.6 PG (ref 26.6–33)
MCHC RBC AUTO-ENTMCNC: 32.7 G/DL (ref 31.5–35.7)
MCV RBC AUTO: 87.3 FL (ref 79–97)
NUMBER OF DOSES: NORMAL
PLATELET # BLD AUTO: 260 10*3/MM3 (ref 140–450)
PMV BLD AUTO: 9.7 FL (ref 6–12)
RBC # BLD AUTO: 3.71 10*6/MM3 (ref 3.77–5.28)
RH BLD: NEGATIVE
RH BLD: NEGATIVE
SARS-COV-2 RNA PNL SPEC NAA+PROBE: NOT DETECTED
T&S EXPIRATION DATE: NORMAL
WBC # BLD AUTO: 9.01 10*3/MM3 (ref 3.4–10.8)

## 2021-06-23 PROCEDURE — S0260 H&P FOR SURGERY: HCPCS | Performed by: OBSTETRICS & GYNECOLOGY

## 2021-06-23 PROCEDURE — 25010000002 RHO D IMMUNE GLOBULIN 1500 UNIT/2ML SOLUTION PREFILLED SYRINGE: Performed by: OBSTETRICS & GYNECOLOGY

## 2021-06-23 PROCEDURE — 87635 SARS-COV-2 COVID-19 AMP PRB: CPT | Performed by: OBSTETRICS & GYNECOLOGY

## 2021-06-23 PROCEDURE — 85461 HEMOGLOBIN FETAL: CPT | Performed by: OBSTETRICS & GYNECOLOGY

## 2021-06-23 PROCEDURE — 86901 BLOOD TYPING SEROLOGIC RH(D): CPT | Performed by: OBSTETRICS & GYNECOLOGY

## 2021-06-23 PROCEDURE — 85027 COMPLETE CBC AUTOMATED: CPT | Performed by: OBSTETRICS & GYNECOLOGY

## 2021-06-23 PROCEDURE — 25010000002 ONDANSETRON PER 1 MG: Performed by: ANESTHESIOLOGY

## 2021-06-23 PROCEDURE — 25010000002 KETOROLAC TROMETHAMINE PER 15 MG: Performed by: ANESTHESIOLOGY

## 2021-06-23 PROCEDURE — 25010000002 MORPHINE PER 10 MG: Performed by: ANESTHESIOLOGY

## 2021-06-23 PROCEDURE — 86850 RBC ANTIBODY SCREEN: CPT | Performed by: OBSTETRICS & GYNECOLOGY

## 2021-06-23 PROCEDURE — 86900 BLOOD TYPING SEROLOGIC ABO: CPT | Performed by: OBSTETRICS & GYNECOLOGY

## 2021-06-23 PROCEDURE — 25010000003 CEFAZOLIN IN DEXTROSE 2-4 GM/100ML-% SOLUTION: Performed by: OBSTETRICS & GYNECOLOGY

## 2021-06-23 PROCEDURE — 25010000002 METHYLERGONOVINE MALEATE PER 0.2 MG: Performed by: OBSTETRICS & GYNECOLOGY

## 2021-06-23 PROCEDURE — 3E0234Z INTRODUCTION OF SERUM, TOXOID AND VACCINE INTO MUSCLE, PERCUTANEOUS APPROACH: ICD-10-PCS | Performed by: OBSTETRICS & GYNECOLOGY

## 2021-06-23 PROCEDURE — 63710000001 DIPHENHYDRAMINE PER 50 MG: Performed by: ANESTHESIOLOGY

## 2021-06-23 PROCEDURE — 59515 CESAREAN DELIVERY: CPT | Performed by: OBSTETRICS & GYNECOLOGY

## 2021-06-23 RX ORDER — SODIUM CHLORIDE 0.9 % (FLUSH) 0.9 %
3 SYRINGE (ML) INJECTION EVERY 12 HOURS SCHEDULED
Status: DISCONTINUED | OUTPATIENT
Start: 2021-06-23 | End: 2021-06-23

## 2021-06-23 RX ORDER — ONDANSETRON 2 MG/ML
4 INJECTION INTRAMUSCULAR; INTRAVENOUS EVERY 6 HOURS PRN
Status: DISCONTINUED | OUTPATIENT
Start: 2021-06-23 | End: 2021-06-23 | Stop reason: HOSPADM

## 2021-06-23 RX ORDER — LANOLIN
CREAM (ML) TOPICAL
Status: DISCONTINUED | OUTPATIENT
Start: 2021-06-23 | End: 2021-06-25 | Stop reason: HOSPADM

## 2021-06-23 RX ORDER — ONDANSETRON 2 MG/ML
4 INJECTION INTRAMUSCULAR; INTRAVENOUS ONCE AS NEEDED
Status: ACTIVE | OUTPATIENT
Start: 2021-06-23 | End: 2021-06-24

## 2021-06-23 RX ORDER — MORPHINE SULFATE 1 MG/ML
INJECTION, SOLUTION EPIDURAL; INTRATHECAL; INTRAVENOUS AS NEEDED
Status: DISCONTINUED | OUTPATIENT
Start: 2021-06-23 | End: 2021-06-23 | Stop reason: SURG

## 2021-06-23 RX ORDER — SODIUM CHLORIDE 0.9 % (FLUSH) 0.9 %
10 SYRINGE (ML) INJECTION AS NEEDED
Status: DISCONTINUED | OUTPATIENT
Start: 2021-06-23 | End: 2021-06-23

## 2021-06-23 RX ORDER — AMOXICILLIN 250 MG
1 CAPSULE ORAL 2 TIMES DAILY
Status: DISCONTINUED | OUTPATIENT
Start: 2021-06-23 | End: 2021-06-25 | Stop reason: HOSPADM

## 2021-06-23 RX ORDER — CEFAZOLIN SODIUM 2 G/100ML
2 INJECTION, SOLUTION INTRAVENOUS ONCE
Status: COMPLETED | OUTPATIENT
Start: 2021-06-23 | End: 2021-06-23

## 2021-06-23 RX ORDER — DIPHENHYDRAMINE HCL 25 MG
25 CAPSULE ORAL EVERY 4 HOURS PRN
Status: DISCONTINUED | OUTPATIENT
Start: 2021-06-23 | End: 2021-06-25 | Stop reason: HOSPADM

## 2021-06-23 RX ORDER — PROMETHAZINE HYDROCHLORIDE 12.5 MG/1
12.5 TABLET ORAL EVERY 4 HOURS PRN
Status: DISCONTINUED | OUTPATIENT
Start: 2021-06-23 | End: 2021-06-25 | Stop reason: HOSPADM

## 2021-06-23 RX ORDER — SODIUM CHLORIDE, SODIUM LACTATE, POTASSIUM CHLORIDE, CALCIUM CHLORIDE 600; 310; 30; 20 MG/100ML; MG/100ML; MG/100ML; MG/100ML
125 INJECTION, SOLUTION INTRAVENOUS CONTINUOUS
Status: DISCONTINUED | OUTPATIENT
Start: 2021-06-23 | End: 2021-06-23

## 2021-06-23 RX ORDER — ONDANSETRON 2 MG/ML
4 INJECTION INTRAMUSCULAR; INTRAVENOUS EVERY 6 HOURS PRN
Status: DISCONTINUED | OUTPATIENT
Start: 2021-06-23 | End: 2021-06-25 | Stop reason: HOSPADM

## 2021-06-23 RX ORDER — HYDROMORPHONE HYDROCHLORIDE 1 MG/ML
0.5 INJECTION, SOLUTION INTRAMUSCULAR; INTRAVENOUS; SUBCUTANEOUS
Status: CANCELLED | OUTPATIENT
Start: 2021-06-23

## 2021-06-23 RX ORDER — ONDANSETRON 4 MG/1
4 TABLET, FILM COATED ORAL EVERY 8 HOURS PRN
Status: DISCONTINUED | OUTPATIENT
Start: 2021-06-23 | End: 2021-06-25 | Stop reason: HOSPADM

## 2021-06-23 RX ORDER — OXYTOCIN-SODIUM CHLORIDE 0.9% IV SOLN 30 UNIT/500ML 30-0.9/5 UT/ML-%
125 SOLUTION INTRAVENOUS CONTINUOUS PRN
Status: COMPLETED | OUTPATIENT
Start: 2021-06-23 | End: 2021-06-23

## 2021-06-23 RX ORDER — ERYTHROMYCIN 5 MG/G
OINTMENT OPHTHALMIC
Status: DISPENSED
Start: 2021-06-23 | End: 2021-06-24

## 2021-06-23 RX ORDER — ONDANSETRON 2 MG/ML
4 INJECTION INTRAMUSCULAR; INTRAVENOUS ONCE AS NEEDED
Status: COMPLETED | OUTPATIENT
Start: 2021-06-23 | End: 2021-06-23

## 2021-06-23 RX ORDER — ONDANSETRON 4 MG/1
4 TABLET, FILM COATED ORAL EVERY 6 HOURS PRN
Status: DISCONTINUED | OUTPATIENT
Start: 2021-06-23 | End: 2021-06-23 | Stop reason: HOSPADM

## 2021-06-23 RX ORDER — CARBOPROST TROMETHAMINE 250 UG/ML
250 INJECTION, SOLUTION INTRAMUSCULAR AS NEEDED
Status: DISCONTINUED | OUTPATIENT
Start: 2021-06-23 | End: 2021-06-23 | Stop reason: HOSPADM

## 2021-06-23 RX ORDER — DIPHENHYDRAMINE HYDROCHLORIDE 50 MG/ML
25 INJECTION INTRAMUSCULAR; INTRAVENOUS EVERY 4 HOURS PRN
Status: DISCONTINUED | OUTPATIENT
Start: 2021-06-23 | End: 2021-06-25 | Stop reason: HOSPADM

## 2021-06-23 RX ORDER — NALOXONE HCL 0.4 MG/ML
0.2 VIAL (ML) INJECTION
Status: DISCONTINUED | OUTPATIENT
Start: 2021-06-23 | End: 2021-06-25 | Stop reason: HOSPADM

## 2021-06-23 RX ORDER — LIDOCAINE HYDROCHLORIDE 10 MG/ML
5 INJECTION, SOLUTION EPIDURAL; INFILTRATION; INTRACAUDAL; PERINEURAL AS NEEDED
Status: DISCONTINUED | OUTPATIENT
Start: 2021-06-23 | End: 2021-06-25 | Stop reason: HOSPADM

## 2021-06-23 RX ORDER — KETOROLAC TROMETHAMINE 30 MG/ML
INJECTION, SOLUTION INTRAMUSCULAR; INTRAVENOUS AS NEEDED
Status: DISCONTINUED | OUTPATIENT
Start: 2021-06-23 | End: 2021-06-23 | Stop reason: SURG

## 2021-06-23 RX ORDER — OXYTOCIN-SODIUM CHLORIDE 0.9% IV SOLN 30 UNIT/500ML 30-0.9/5 UT/ML-%
999 SOLUTION INTRAVENOUS ONCE
Status: COMPLETED | OUTPATIENT
Start: 2021-06-23 | End: 2021-06-23

## 2021-06-23 RX ORDER — OXYTOCIN-SODIUM CHLORIDE 0.9% IV SOLN 30 UNIT/500ML 30-0.9/5 UT/ML-%
250 SOLUTION INTRAVENOUS CONTINUOUS PRN
Status: ACTIVE | OUTPATIENT
Start: 2021-06-23 | End: 2021-06-23

## 2021-06-23 RX ORDER — SODIUM CHLORIDE 0.9 % (FLUSH) 0.9 %
3 SYRINGE (ML) INJECTION EVERY 12 HOURS SCHEDULED
Status: DISCONTINUED | OUTPATIENT
Start: 2021-06-23 | End: 2021-06-24

## 2021-06-23 RX ORDER — ACETAMINOPHEN 500 MG
1000 TABLET ORAL ONCE
Status: COMPLETED | OUTPATIENT
Start: 2021-06-23 | End: 2021-06-23

## 2021-06-23 RX ORDER — LIDOCAINE HYDROCHLORIDE 10 MG/ML
5 INJECTION, SOLUTION EPIDURAL; INFILTRATION; INTRACAUDAL; PERINEURAL AS NEEDED
Status: DISCONTINUED | OUTPATIENT
Start: 2021-06-23 | End: 2021-06-23

## 2021-06-23 RX ORDER — HYDROCORTISONE 25 MG/G
CREAM TOPICAL 3 TIMES DAILY PRN
Status: DISCONTINUED | OUTPATIENT
Start: 2021-06-23 | End: 2021-06-25 | Stop reason: HOSPADM

## 2021-06-23 RX ORDER — PHYTONADIONE 1 MG/.5ML
INJECTION, EMULSION INTRAMUSCULAR; INTRAVENOUS; SUBCUTANEOUS
Status: DISPENSED
Start: 2021-06-23 | End: 2021-06-24

## 2021-06-23 RX ORDER — SIMETHICONE 80 MG
80 TABLET,CHEWABLE ORAL 4 TIMES DAILY PRN
Status: DISCONTINUED | OUTPATIENT
Start: 2021-06-23 | End: 2021-06-25 | Stop reason: HOSPADM

## 2021-06-23 RX ORDER — IBUPROFEN 600 MG/1
600 TABLET ORAL EVERY 6 HOURS PRN
Status: DISCONTINUED | OUTPATIENT
Start: 2021-06-23 | End: 2021-06-25 | Stop reason: HOSPADM

## 2021-06-23 RX ORDER — OXYCODONE HYDROCHLORIDE AND ACETAMINOPHEN 5; 325 MG/1; MG/1
2 TABLET ORAL EVERY 4 HOURS PRN
Status: DISCONTINUED | OUTPATIENT
Start: 2021-06-23 | End: 2021-06-25 | Stop reason: HOSPADM

## 2021-06-23 RX ORDER — METHYLERGONOVINE MALEATE 0.2 MG/ML
200 INJECTION INTRAVENOUS AS NEEDED
Status: DISCONTINUED | OUTPATIENT
Start: 2021-06-23 | End: 2021-06-23 | Stop reason: HOSPADM

## 2021-06-23 RX ORDER — BUPIVACAINE HYDROCHLORIDE 7.5 MG/ML
INJECTION, SOLUTION EPIDURAL; RETROBULBAR AS NEEDED
Status: DISCONTINUED | OUTPATIENT
Start: 2021-06-23 | End: 2021-06-23 | Stop reason: SURG

## 2021-06-23 RX ORDER — MORPHINE SULFATE 2 MG/ML
2 INJECTION, SOLUTION INTRAMUSCULAR; INTRAVENOUS
Status: ACTIVE | OUTPATIENT
Start: 2021-06-23 | End: 2021-06-24

## 2021-06-23 RX ORDER — FAMOTIDINE 10 MG/ML
20 INJECTION, SOLUTION INTRAVENOUS ONCE AS NEEDED
Status: COMPLETED | OUTPATIENT
Start: 2021-06-23 | End: 2021-06-23

## 2021-06-23 RX ORDER — SODIUM CHLORIDE 0.9 % (FLUSH) 0.9 %
10 SYRINGE (ML) INJECTION AS NEEDED
Status: DISCONTINUED | OUTPATIENT
Start: 2021-06-23 | End: 2021-06-25 | Stop reason: HOSPADM

## 2021-06-23 RX ADMIN — DIPHENHYDRAMINE HYDROCHLORIDE 25 MG: 25 CAPSULE ORAL at 22:31

## 2021-06-23 RX ADMIN — HUMAN RHO(D) IMMUNE GLOBULIN 1500 UNITS: 1500 SOLUTION INTRAMUSCULAR; INTRAVENOUS at 21:24

## 2021-06-23 RX ADMIN — CEFAZOLIN SODIUM 2 G: 2 INJECTION, SOLUTION INTRAVENOUS at 11:54

## 2021-06-23 RX ADMIN — SODIUM CHLORIDE, PRESERVATIVE FREE 3 ML: 5 INJECTION INTRAVENOUS at 21:24

## 2021-06-23 RX ADMIN — IBUPROFEN 600 MG: 600 TABLET, FILM COATED ORAL at 22:31

## 2021-06-23 RX ADMIN — SODIUM CHLORIDE, POTASSIUM CHLORIDE, SODIUM LACTATE AND CALCIUM CHLORIDE 125 ML/HR: 600; 310; 30; 20 INJECTION, SOLUTION INTRAVENOUS at 10:15

## 2021-06-23 RX ADMIN — FAMOTIDINE 20 MG: 10 INJECTION INTRAVENOUS at 11:32

## 2021-06-23 RX ADMIN — OXYTOCIN 125 ML/HR: 10 INJECTION INTRAVENOUS at 13:54

## 2021-06-23 RX ADMIN — DOCUSATE SODIUM 50MG AND SENNOSIDES 8.6MG 1 TABLET: 8.6; 5 TABLET, FILM COATED ORAL at 19:38

## 2021-06-23 RX ADMIN — SODIUM CHLORIDE, POTASSIUM CHLORIDE, SODIUM LACTATE AND CALCIUM CHLORIDE 1000 ML: 600; 310; 30; 20 INJECTION, SOLUTION INTRAVENOUS at 09:25

## 2021-06-23 RX ADMIN — CEFAZOLIN SODIUM 2 G: 2 INJECTION, SOLUTION INTRAVENOUS at 12:09

## 2021-06-23 RX ADMIN — OXYTOCIN 999 ML/HR: 10 INJECTION INTRAVENOUS at 12:25

## 2021-06-23 RX ADMIN — ONDANSETRON 4 MG: 2 INJECTION INTRAMUSCULAR; INTRAVENOUS at 11:32

## 2021-06-23 RX ADMIN — METHYLERGONOVINE MALEATE 200 MCG: 0.2 INJECTION INTRAVENOUS at 12:28

## 2021-06-23 RX ADMIN — MORPHINE SULFATE 0.2 MG: 1 INJECTION, SOLUTION EPIDURAL; INTRATHECAL; INTRAVENOUS at 12:09

## 2021-06-23 RX ADMIN — ACETAMINOPHEN 1000 MG: 500 TABLET, FILM COATED ORAL at 11:32

## 2021-06-23 RX ADMIN — KETOROLAC TROMETHAMINE 30 MG: 30 INJECTION, SOLUTION INTRAMUSCULAR; INTRAVENOUS at 12:28

## 2021-06-23 RX ADMIN — BUPIVACAINE HYDROCHLORIDE 1.8 ML: 7.5 INJECTION, SOLUTION EPIDURAL; RETROBULBAR at 12:09

## 2021-06-23 NOTE — ANESTHESIA POSTPROCEDURE EVALUATION
"Patient: Madie Givens    Procedure Summary     Date: 21 Room / Location:  [facility]    Anesthesia Start: 1201 Anesthesia Stop: 1307    Procedure:  SECTION PRIMARY (N/A Abdomen) Diagnosis:       Maternal care for breech presentation, single gestation      (Maternal care for breech presentation, single gestation [O32.1XX0])    Surgeons: Skylar Singh MD Provider: Lukas Gutiérrez MD    Anesthesia Type: spinal ASA Status: 2          Anesthesia Type: spinal    Vitals  Vitals Value Taken Time   /57 21 1417   Temp 36.4 °C (97.5 °F) 21 1315   Pulse 66 21 1425   Resp 16 21 1402   SpO2 99 % 21 1425   Vitals shown include unvalidated device data.        Post Anesthesia Care and Evaluation    Patient location during evaluation: bedside  Patient participation: complete - patient participated  Level of consciousness: awake  Pain management: adequate  Airway patency: patent  Anesthetic complications: No anesthetic complications    Cardiovascular status: acceptable  Respiratory status: acceptable  Hydration status: acceptable    Comments: */67   Pulse 58   Temp 36.4 °C (97.5 °F) (Oral)   Resp 16   Ht 162.6 cm (64\")   Wt 92.1 kg (203 lb)   LMP 2020   SpO2 96%   Breastfeeding Unknown   BMI 34.84 kg/m²         "

## 2021-06-23 NOTE — OP NOTE
[facility]     2021    Patient:Madie Givens   MR#:[id number]     Section Procedure Note    Indications: malpresentation: breech    Pre-operative Diagnosis: Intrauterine pregnancy at 39w3d    Post-operative Diagnosis: same    Procedure:    Primary low transverse  section       Surgeon: Skylar Singh MD     Assistants: Manoj Peters MD     Anesthesia: Spinal anesthesia    Prenatal care problem list:    Patient Active Problem List   Diagnosis   • Supervision of normal first pregnancy, antepartum   • Rubella non-immune status, antepartum   • Maternal varicella, non-immune   • Rh negative status during pregnancy   • BV (bacterial vaginosis)   • Nausea and vomiting during pregnancy   • Obesity in pregnancy, antepartum   • Maternal anemia in pregnancy, antepartum   • Low weight gain during pregnancy in third trimester   • Maternal care for breech presentation, single gestation   • Breech presentation of fetus       Procedure Details   The patient was seen in the LDR preoperatively. The risks, benefits, complications, treatment options, and expected outcomes were discussed with the patient.  The patient concurred with the proposed plan, giving informed consent.  The site of surgery is discussed. The patient was taken to Operating Room # 1, identified as Madie Givens and the procedure verified as  Delivery. A Time Out was held and the above information confirmed.    After induction of anesthesia, the patient was draped and prepped in the usual sterile manner. A Pfannenstiel incision was made and carried down through the subcutaneous tissue to the fascia. Fascial incision was made and extended transversely. The fascia was  from the underlying rectus tissue superiorly and inferiorly. The peritoneum was identified and entered. Peritoneal incision was extended longitudinally. The utero-vesical peritoneal reflection was incised  transversely and the bladder flap was bluntly freed from the lower uterine segment. A low transverse uterine incision was made. Delivered from breech presentation with buttocks first, followed by trunk, then arms and head was a female  fetus 3335 g (7 lb 5.6 oz)  with Apgar scores of 8   at one minute and 9  at five minutes. After the umbilical cord was clamped and cut cord blood was obtained for evaluation. The placenta was removed intact and appeared normal. The uterine outline, tubes and ovaries appeared normal.  The uterine incision was closed with two layers of running locked sutures of 0 Monocryl. Hemostasis was observed. Uterus was returned to the abdomen and hysterotomy re-inspected and observed to be hemostatic.      The peritoneum was reapproximated with 3-0 Monocryl and the fascia was then reapproximated with running sutures of 0 Vicryl. The subcutaneous layer was reapproximated with 3-0 Monocryl and the skin was reapproximated with 4-0 vicryl in a subcuticular fashion.     Instrument, sponge, and needle counts were correct prior the abdominal closure and at the conclusion of the case.     Assistant: Manoj Peters MD  was responsible for performing the following activities: Retraction, Suction and Irrigation and their skilled assistance was necessary for the success of this case.    Findings:  Normal uterus, tubes and ovaries      Estimated Blood Loss:  see nursing flow sheet                    Specimens:  Placenta            Complications:  None; patient tolerated the procedure well.           Disposition: PACU - hemodynamically stable.           Condition: stable    Attending Attestation: I was present and scrubbed for the entire procedure.        Skylar Singh MD  2021 14:38 EDT

## 2021-06-23 NOTE — PLAN OF CARE
Goal Outcome Evaluation:         Pt transferred to [facility] from labor and delivery. Pt fundus firm at U with scant bleeding. Pt able to move all extremities. Dressing is dry and intact with no drainage. Pt denies any pain at this time. Will continue to monitor and assess.

## 2021-06-23 NOTE — PLAN OF CARE
Problem: Adult Inpatient Plan of Care  Goal: Plan of Care Review  Outcome: Ongoing, Progressing  Flowsheets (Taken 6/23/2021 1316)  Progress: improving  Plan of Care Reviewed With:   patient   significant other  Goal: Absence of Hospital-Acquired Illness or Injury  Outcome: Ongoing, Progressing  Intervention: Identify and Manage Fall Risk  Recent Flowsheet Documentation  Taken 6/23/2021 0945 by Makenzie Navarrete RN  Safety Promotion/Fall Prevention: safety round/check completed  Intervention: Prevent Skin Injury  Recent Flowsheet Documentation  Taken 6/23/2021 0945 by Makenzie Navarrete RN  Body Position: sitting up in bed  Goal: Optimal Comfort and Wellbeing  Outcome: Ongoing, Progressing  Intervention: Provide Person-Centered Care  Recent Flowsheet Documentation  Taken 6/23/2021 0945 by Makenzie Navarrete RN  Trust Relationship/Rapport:   care explained   choices provided   questions answered   questions encouraged  Goal: Readiness for Transition of Care  Outcome: Ongoing, Progressing  Intervention: Mutually Develop Transition Plan  Recent Flowsheet Documentation  Taken 6/23/2021 0946 by Makenzie Navarrete RN  Equipment Currently Used at Home: none  Taken 6/23/2021 0943 by Makenzie Navarrete RN  Equipment Needed After Discharge: none  Equipment Currently Used at Home: none  Transportation Anticipated: car, drives self  Transportation Concerns: car, none  Readmission Within the Last 30 Days: no previous admission in last 30 days  Patient/Family Anticipated Services at Transition: none  Patient/Family Anticipates Transition to: home     Problem: Device-Related Complication Risk (Anesthesia/Analgesia, Neuraxial)  Goal: Safe Infusion Delivery Completion  Outcome: Ongoing, Progressing     Problem: Infection (Anesthesia/Analgesia, Neuraxial)  Goal: Absence of Infection Signs and Symptoms  Outcome: Ongoing, Progressing     Problem: Pain (Anesthesia/Analgesia, Neuraxial)  Goal: Effective Pain Control  Outcome: Ongoing, Progressing      Problem: Respiratory Compromise (Anesthesia/Analgesia, Neuraxial)  Goal: Effective Oxygenation and Ventilation  Outcome: Ongoing, Progressing     Problem: Urinary Retention (Anesthesia/Analgesia, Neuraxial)  Goal: Effective Urinary Elimination  Outcome: Ongoing, Progressing     Problem:  Fall Injury Risk  Goal: Absence of Fall, Infant Drop and Related Injury  Outcome: Ongoing, Progressing  Intervention: Promote Injury-Free Environment  Recent Flowsheet Documentation  Taken 2021 0945 by Makenzie Navarrete, MARCELINO  Safety Promotion/Fall Prevention: safety round/check completed   Goal Outcome Evaluation:  Plan of Care Reviewed With: patient, significant other        Progress: improving

## 2021-06-23 NOTE — H&P
[facility]  Obstetric History and Physical    Chief Complaint   Patient presents with   • Scheduled      breech       Subjective     Patient is a 21 y.o. female  currently at 39w3d, who presents for primary  section due to breech presentation of fetus. Denies ctx, loss of fluid or vag bleeding. +FM      Her prenatal care is complicated by  Rubella Non-Immune and malpresentation  breech.  Her previous obstetric/gynecological history is noted for is non-contributory.    The following portions of the patients history were reviewed and updated as appropriate: current medications, allergies, past medical history, past surgical history, past family history, past social history and problem list .       Prenatal Information:  Prenatal Results     POC Urine Glucose/Protein     Test Value Reference Range Date Time    Urine Glucose  Negative mg/dL Negative, 1000 mg/dL (3+) 21 0844    Urine Protein  Negative mg/dL Negative 21 0844          Initial Prenatal Labs     Test Value Reference Range Date Time    Hemoglobin  11.3 g/dL 12.0 - 15.9 20 0903       11.9 g/dL 12.0 - 15.9 20 1122    Hematocrit  33.2 % 34.0 - 46.6 20 0903       37.3 % 34.0 - 46.6 20 1122    Platelets  260 10*3/mm3 140 - 450 21 0921       258 10*3/mm3 140 - 450 21 1637       242 10*3/mm3 140 - 450 21 1159       235 10*3/mm3 140 - 450 20 0903       204 10*3/mm3 140 - 450 20 1122    Rubella IgG  <0.90 index Immune >0.99 20 1122    Hepatitis B SAg  Negative  Negative 20 1122    Hepatitis C Ab  <0.1 s/co ratio 0.0 - 0.9 20 1122    RPR  Comment  Non-Reactive 20 1122    ABO  O   21 0921    Rh  Negative   21 0921    Antibody Screen  Negative  Negative 20 1122    HIV  Non Reactive  Non Reactive 20 1122    Urine Culture  Final report   20 1344    Gonorrhea ^ negative   20     Chlamydia ^ negative   20     TSH               2nd and 3rd Trimester     Test Value Reference Range Date Time    Hemoglobin (repeated)  10.6 g/dL 12.0 - 15.9 06/23/21 0921       10.3 g/dL 12.0 - 15.9 06/03/21 1637       10.6 g/dL 12.0 - 15.9 05/18/21 1159       9.9 g/dL 11.1 - 15.9 03/23/21 1108    Hematocrit (repeated)  32.4 % 34.0 - 46.6 06/23/21 0921       30.7 % 34.0 - 46.6 06/03/21 1637       32.0 % 34.0 - 46.6 05/18/21 1159       29.9 % 34.0 - 46.6 03/23/21 1108    GCT  93 mg/dL 65 - 139 03/23/21 1108    Antibody Screen (repeated)  Negative   06/23/21 0921       Negative  Negative 03/23/21 1108    GTT Fasting        GTT 1 Hr        GTT 2 Hr        GTT 3 Hr        Group B Strep  Negative  Negative 06/02/21 1244          Drug Screening     Test Value Reference Range Date Time    Amphetamine Screen        Barbiturate Screen        Benzodiazepine Screen        Methadone Screen        Phencyclidine Screen        Opiates Screen        THC Screen        Cocaine Screen        Propoxyphene Screen        Buprenorphine Screen        Methamphetamine Screen        Oxycodone Screen        Tricyclic Antidepressants Screen              Other (Risk screening)     Test Value Reference Range Date Time    Varicella IgG  <135 index Immune >165 12/02/20 1122    Parvovirus IgG        CMV IgG        Cystic Fibrosis        Hemoglobin electrophoresis        NIPT        MSAFP-4        AFP (for NTD only)              Legend    ^: Historical                      External Prenatal Results     Pregnancy Outside Results - Transcribed From Office Records - See Scanned Records For Details     Test Value Date Time    ABO  O  06/23/21 0921    Rh  Negative  06/23/21 0921    Antibody Screen  Negative  06/23/21 0921       Negative  03/23/21 1108       Negative  12/02/20 1122    Varicella IgG  <135 index 12/02/20 1122    Rubella  <0.90 index 12/02/20 1122    Hgb  10.6 g/dL 06/23/21 0921       10.3 g/dL 06/03/21 1637       10.6 g/dL 05/18/21 1159       9.9 g/dL 03/23/21 1108       11.3  g/dL 20 0903       11.9 g/dL 20 1122    Hct  32.4 % 21 0921       30.7 % 21 1637       32.0 % 21 1159       29.9 % 21 1108       33.2 % 20 0903       37.3 % 20 1122    Glucose Fasting GTT       Glucose Tolerance Test 1 hour       Glucose Tolerance Test 3 hour       Gonorrhea (discrete) ^ negative  20     Chlamydia (discrete) ^ negative  20     RPR  Comment  20 1122    VDRL       Syphilis Antibody       HBsAg  Negative  20 1122    Herpes Simplex Virus PCR       Herpes Simplex VIrus Culture       HIV  Non Reactive  20 1122    Hep C RNA Quant PCR       Hep C Antibody  <0.1 s/co ratio 20 1122    AFP       Group B Strep  Negative  21 1244    GBS Susceptibility to Clindamycin       GBS Susceptibility to Erythromycin       Fetal Fibronectin       Genetic Testing, Maternal Blood             Drug Screening     Test Value Date Time    Urine Drug Screen       Amphetamine Screen       Barbiturate Screen       Benzodiazepine Screen       Methadone Screen       Phencyclidine Screen       Opiates Screen       THC Screen       Cocaine Screen       Propoxyphene Screen       Buprenorphine Screen       Methamphetamine Screen       Oxycodone Screen       Tricyclic Antidepressants Screen             Legend    ^: Historical                         Past OB History:     OB History    Para Term  AB Living   1 0 0 0 0 0   SAB TAB Ectopic Molar Multiple Live Births   0 0 0 0 0 0      # Outcome Date GA Lbr Galo/2nd Weight Sex Delivery Anes PTL Lv   1 Current               Obstetric Comments   20 - 10-3 weeks - FAUSTINO 21- [facility]    21 - 18-2 weeks - Normal but Incomplete anatomy - Normal CL - [facility]    21 - 22-2 weeks - Normal completed anatomy - EFW 59%, AC 82% - [facility]    5/3/21 - 32-1 weeks - EFW 47%, AC 33% Breech - [facility]    21 - 36-2 weeks - EFW 37%, AC 24% Breech - [facility]          Past Medical History: Past Medical History:    Diagnosis Date   • Asthma     childhood      Past Surgical History History reviewed. No pertinent surgical history.   Family History: Family History   Problem Relation Age of Onset   • No Known Problems Mother    • No Known Problems Father       Social History:  reports that she has never smoked. She has never used smokeless tobacco.   reports no history of alcohol use.   reports no history of drug use.        Review of Systems   Constitutional: Negative for chills, fatigue and fever.   Gastrointestinal: Negative for abdominal distention and abdominal pain.   Genitourinary: Negative for dyspareunia, dysuria, vaginal bleeding, vaginal discharge and vaginal pain.   All other systems reviewed and are negative.        Objective     Vital Signs Range for the last 24 hours  Temperature: Temp:  [98.1 °F (36.7 °C)] 98.1 °F (36.7 °C)   Temp Source: Temp src: Oral   BP: BP: (129)/(71) 129/71   Pulse: Heart Rate:  [77] 77   Respirations: Resp:  [18] 18   SPO2: SpO2:  [99 %] 99 %   O2 Amount (l/min):     O2 Devices Device (Oxygen Therapy): room air   Weight: Weight:  [92.1 kg (203 lb)] 92.1 kg (203 lb)     Physical Examination: General appearance - alert, well appearing, and in no distress  Abdomen - gravid, soft, nontender, nondistended, no masses or organomegaly  Musculoskeletal - no joint tenderness, deformity or swelling  Extremities - peripheral pulses normal, no pedal edema, no clubbing or cyanosis  Skin - normal coloration and turgor, no rashes, no suspicious skin lesions noted    Presentation: Breech    Cervix: Exam by:     Dilation:     Effacement:     Station:       Fetal Heart Rate Assessment   Method: Fetal HR Assessment Method: external   Beats/min: Fetal HR (beats/min): 150   Baseline: Fetal Heart Baseline Rate: normal range   Variability: Fetal HR Variability: moderate (amplitude range 6 to 25 bpm)   Accels: Fetal HR Accelerations: greater than/equal to 15 bpm, lasting at least 15 seconds   Decels: Fetal HR  Decelerations: absent   Tracing Category:       Uterine Assessment   Method: Method: external tocotransducer, palpation   Frequency (min):     Ctx Count in 10 min:     Duration:     Intensity: Contraction Intensity: no contractions   Intensity by IUPC:     Resting Tone: Uterine Resting Tone: soft by palpation   Resting Tone by IUPC:     Renetta Units:       Laboratory Results:   Results from last 7 days   Lab Units 21  0921   WBC 10*3/mm3 9.01   HEMOGLOBIN g/dL 10.6*   HEMATOCRIT % 32.4*   PLATELETS 10*3/mm3 260       Assessment/Plan       Maternal care for breech presentation, single gestation    Breech presentation of fetus      Assessment & Plan    Assessment:  1.  Intrauterine pregnancy at 39w3d gestation with reactive, reassuring fetal status.    2.  Breech presentation of fetus   3.  Obstetrical history significant for is non-contributory.  4.  GBS status:   Strep Gp B Culture   Date Value Ref Range Status   2021 Negative Negative Final     Comment:     Centers for Disease Control and Prevention (CDC) and American Congress  of Obstetricians and Gynecologists (ACOG) guidelines for prevention of   group B streptococcal (GBS) disease specify co-collection of  a vaginal and rectal swab specimen to maximize sensitivity of GBS  detection. Per the CDC and ACOG, swabbing both the lower vagina and  rectum substantially increases the yield of detection compared with  sampling the vagina alone.  Penicillin G, ampicillin, or cefazolin are indicated for intrapartum  prophylaxis of  GBS colonization. Reflex susceptibility  testing should be performed prior to use of clindamycin only on GBS  isolates from penicillin-allergic women who are considered a high risk  for anaphylaxis. Treatment with vancomycin without additional testing  is warranted if resistance to clindamycin is noted.         Plan:  1. Primary  scheduled  2. Plan of care has been reviewed with patient and   3.   Risks, benefits of treatment plan have been discussed.  4.  All questions have been answered.        Skylar Singh MD  6/23/2021  10:33 EDT

## 2021-06-23 NOTE — ANESTHESIA PREPROCEDURE EVALUATION
Anesthesia Evaluation     no history of anesthetic complications:  NPO Solid Status: > 8 hours  NPO Liquid Status: > 2 hours           Airway   no difficulty expected  Dental - normal exam     Pulmonary - normal exam   (+) asthma (in childhood),  (-) COPD, sleep apnea, not a smoker    PE comment: nonlabored  Cardiovascular - negative cardio ROS and normal exam    Rhythm: regular  Rate: normal    (-) hypertension, valvular problems/murmurs, past MI, CAD, dysrhythmias, angina      Neuro/Psych- negative ROS  (-) seizures, TIA, CVA  GI/Hepatic/Renal/Endo - negative ROS   (-) GERD, liver disease, no renal disease, diabetes, no thyroid disorder    Musculoskeletal (-) negative ROS    Abdominal    Substance History      OB/GYN    (+) Pregnant (@39wks 3days; breech presentation),         Other                      Anesthesia Plan    ASA 2     spinal       Anesthetic plan, all risks, benefits, and alternatives have been provided, discussed and informed consent has been obtained with: patient.

## 2021-06-23 NOTE — ANESTHESIA PROCEDURE NOTES
Intrathecal Block      Patient reassessed immediately prior to procedure    Patient location during procedure: OR  Performed By  Anesthesiologist: Lukas Gutiérrez MD  Preanesthetic Checklist  Completed: patient identified, site marked, surgical consent, pre-op evaluation, timeout performed, IV checked, risks and benefits discussed and monitors and equipment checked  Intrathecal Block Prep:  Pt Position:sitting  Sterile Tech:sterile barrier, gloves and cap  Prep:chloraprep  Monitoring:blood pressure monitoring, continuous pulse oximetry and EKG  Intrathecal Block Procedure:  Approach:midline  Location:L2-L3  Needle Type:Calvin  Needle Gauge:25 G  Placement of Needle Event: cerebrospinal fluid  Fluid Appearance:clear  Post Assessment:  Patient Tolerance:patient tolerated the procedure well with no apparent complications

## 2021-06-24 LAB
BASOPHILS # BLD AUTO: 0.02 10*3/MM3 (ref 0–0.2)
BASOPHILS NFR BLD AUTO: 0.2 % (ref 0–1.5)
DEPRECATED RDW RBC AUTO: 44.8 FL (ref 37–54)
EOSINOPHIL # BLD AUTO: 0.04 10*3/MM3 (ref 0–0.4)
EOSINOPHIL NFR BLD AUTO: 0.5 % (ref 0.3–6.2)
ERYTHROCYTE [DISTWIDTH] IN BLOOD BY AUTOMATED COUNT: 14.2 % (ref 12.3–15.4)
HCT VFR BLD AUTO: 27.2 % (ref 34–46.6)
HGB BLD-MCNC: 9.1 G/DL (ref 12–15.9)
IMM GRANULOCYTES # BLD AUTO: 0.06 10*3/MM3 (ref 0–0.05)
IMM GRANULOCYTES NFR BLD AUTO: 0.7 % (ref 0–0.5)
LYMPHOCYTES # BLD AUTO: 1.05 10*3/MM3 (ref 0.7–3.1)
LYMPHOCYTES NFR BLD AUTO: 12.1 % (ref 19.6–45.3)
MCH RBC QN AUTO: 28.8 PG (ref 26.6–33)
MCHC RBC AUTO-ENTMCNC: 33.5 G/DL (ref 31.5–35.7)
MCV RBC AUTO: 86.1 FL (ref 79–97)
MONOCYTES # BLD AUTO: 0.65 10*3/MM3 (ref 0.1–0.9)
MONOCYTES NFR BLD AUTO: 7.5 % (ref 5–12)
NEUTROPHILS NFR BLD AUTO: 6.83 10*3/MM3 (ref 1.7–7)
NEUTROPHILS NFR BLD AUTO: 79 % (ref 42.7–76)
NRBC BLD AUTO-RTO: 0 /100 WBC (ref 0–0.2)
PLATELET # BLD AUTO: 237 10*3/MM3 (ref 140–450)
PMV BLD AUTO: 9.8 FL (ref 6–12)
RBC # BLD AUTO: 3.16 10*6/MM3 (ref 3.77–5.28)
WBC # BLD AUTO: 8.65 10*3/MM3 (ref 3.4–10.8)

## 2021-06-24 PROCEDURE — 85025 COMPLETE CBC W/AUTO DIFF WBC: CPT | Performed by: OBSTETRICS & GYNECOLOGY

## 2021-06-24 RX ADMIN — OXYCODONE AND ACETAMINOPHEN 2 TABLET: 5; 325 TABLET ORAL at 07:10

## 2021-06-24 RX ADMIN — IBUPROFEN 600 MG: 600 TABLET, FILM COATED ORAL at 07:10

## 2021-06-24 RX ADMIN — OXYCODONE AND ACETAMINOPHEN 2 TABLET: 5; 325 TABLET ORAL at 20:12

## 2021-06-24 RX ADMIN — DOCUSATE SODIUM 50MG AND SENNOSIDES 8.6MG 1 TABLET: 8.6; 5 TABLET, FILM COATED ORAL at 09:20

## 2021-06-24 RX ADMIN — DOCUSATE SODIUM 50MG AND SENNOSIDES 8.6MG 1 TABLET: 8.6; 5 TABLET, FILM COATED ORAL at 20:12

## 2021-06-24 RX ADMIN — IBUPROFEN 600 MG: 600 TABLET, FILM COATED ORAL at 20:12

## 2021-06-24 RX ADMIN — OXYCODONE AND ACETAMINOPHEN 2 TABLET: 5; 325 TABLET ORAL at 13:32

## 2021-06-24 NOTE — PLAN OF CARE
Problem: Adult Inpatient Plan of Care  Goal: Plan of Care Review  Outcome: Ongoing, Progressing  Goal: Patient-Specific Goal (Individualized)  Outcome: Ongoing, Progressing  Goal: Absence of Hospital-Acquired Illness or Injury  Outcome: Ongoing, Progressing  Intervention: Identify and Manage Fall Risk  Recent Flowsheet Documentation  Taken 2021 1817 by Mari Pruitt RN  Safety Promotion/Fall Prevention:   clutter free environment maintained   safety round/check completed  Taken 2021 1610 by Mari Pruitt RN  Safety Promotion/Fall Prevention:   clutter free environment maintained   safety round/check completed  Taken 2021 1330 by Mari Pruitt RN  Safety Promotion/Fall Prevention:   safety round/check completed   clutter free environment maintained  Taken 2021 1204 by Mari Pruitt RN  Safety Promotion/Fall Prevention: (pt sleeping)   clutter free environment maintained   safety round/check completed  Taken 2021 1015 by Mari Pruitt RN  Safety Promotion/Fall Prevention:   clutter free environment maintained   safety round/check completed  Goal: Optimal Comfort and Wellbeing  Outcome: Ongoing, Progressing  Intervention: Provide Person-Centered Care  Recent Flowsheet Documentation  Taken 2021 1015 by Mari Pruitt RN  Trust Relationship/Rapport: care explained  Goal: Readiness for Transition of Care  Outcome: Ongoing, Progressing     Problem: Respiratory Compromise ( Delivery)  Goal: Effective Oxygenation and Ventilation  Outcome: Ongoing, Progressing     Problem: Device-Related Complication Risk (Anesthesia/Analgesia, Neuraxial)  Goal: Safe Infusion Delivery Completion  Outcome: Ongoing, Progressing     Problem: Infection (Anesthesia/Analgesia, Neuraxial)  Goal: Absence of Infection Signs and Symptoms  Outcome: Ongoing, Progressing     Problem: Nausea and Vomiting (Anesthesia/Analgesia, Neuraxial)  Goal: Nausea and Vomiting Relief  Outcome: Ongoing,  Progressing     Problem: Pain (Anesthesia/Analgesia, Neuraxial)  Goal: Effective Pain Control  Outcome: Ongoing, Progressing     Problem: Respiratory Compromise (Anesthesia/Analgesia, Neuraxial)  Goal: Effective Oxygenation and Ventilation  Outcome: Ongoing, Progressing     Problem: Sensorimotor Impairment (Anesthesia/Analgesia, Neuraxial)  Goal: Baseline Motor Function  Outcome: Ongoing, Progressing  Intervention: Optimize Sensorimotor Function  Recent Flowsheet Documentation  Taken 2021 1817 by Mari Pruitt RN  Safety Promotion/Fall Prevention:   clutter free environment maintained   safety round/check completed  Taken 2021 1610 by Mari Pruitt RN  Safety Promotion/Fall Prevention:   clutter free environment maintained   safety round/check completed  Taken 2021 1330 by Mari Pruitt RN  Safety Promotion/Fall Prevention:   safety round/check completed   clutter free environment maintained  Taken 2021 1204 by Mari Pruitt RN  Safety Promotion/Fall Prevention: (pt sleeping)   clutter free environment maintained   safety round/check completed  Taken 2021 1015 by Mari Pruitt RN  Safety Promotion/Fall Prevention:   clutter free environment maintained   safety round/check completed     Problem: Urinary Retention (Anesthesia/Analgesia, Neuraxial)  Goal: Effective Urinary Elimination  Outcome: Ongoing, Progressing     Problem:  Fall Injury Risk  Goal: Absence of Fall, Infant Drop and Related Injury  Outcome: Ongoing, Progressing  Intervention: Promote Injury-Free Environment  Recent Flowsheet Documentation  Taken 2021 1817 by Mari Pruitt RN  Safety Promotion/Fall Prevention:   clutter free environment maintained   safety round/check completed  Taken 2021 1610 by Mari Pruitt RN  Safety Promotion/Fall Prevention:   clutter free environment maintained   safety round/check completed  Taken 2021 1330 by Mari Pruitt RN  Safety  Promotion/Fall Prevention:   safety round/check completed   clutter free environment maintained  Taken 2021 1204 by Mari Pruitt RN  Safety Promotion/Fall Prevention: (pt sleeping)   clutter free environment maintained   safety round/check completed  Taken 2021 1015 by Mari Pruitt RN  Safety Promotion/Fall Prevention:   clutter free environment maintained   safety round/check completed     Problem: Adjustment to Role Transition (Postpartum  Delivery)  Goal: Successful Maternal Role Transition  Outcome: Ongoing, Progressing     Problem: Bleeding (Postpartum  Delivery)  Goal: Hemostasis  Outcome: Ongoing, Progressing     Problem: Infection (Postpartum  Delivery)  Goal: Absence of Infection Signs and Symptoms  Outcome: Ongoing, Progressing     Problem: Pain (Postpartum  Delivery)  Goal: Acceptable Pain Control  Outcome: Ongoing, Progressing     Problem: Postoperative Nausea and Vomiting (Postpartum  Delivery)  Goal: Nausea and Vomiting Relief  Outcome: Ongoing, Progressing     Problem: Postoperative Urinary Retention (Postpartum  Delivery)  Goal: Effective Urinary Elimination  Outcome: Ongoing, Progressing   Goal Outcome Evaluation:

## 2021-06-24 NOTE — PLAN OF CARE
Goal Outcome Evaluation:              Outcome Summary: Pain controlled. VSS. F/C to be removed. Ambulating well. Bottle feeding.

## 2021-06-24 NOTE — PROGRESS NOTES
2021    Name:Madie Givens    MR#:[id number]     Progress Note:  Post-Op day 1 S/P    HD:1    Subjective   21 y.o. yo Female  s/p CS at 39w3d doing well. Pain well controlled. Tolerating regular diet and having flatus. Lochia normal. Has ambulated and voided    Patient Active Problem List   Diagnosis   • Supervision of normal first pregnancy, antepartum   • Rubella non-immune status, antepartum   • Maternal varicella, non-immune   • Rh negative status during pregnancy   • BV (bacterial vaginosis)   • Nausea and vomiting during pregnancy   • Obesity in pregnancy, antepartum   • Maternal anemia in pregnancy, antepartum   • Low weight gain during pregnancy in third trimester   • Maternal care for breech presentation, single gestation   • Breech presentation of fetus        Objective    Vitals  Temp:  Temp:  [94.8 °F (34.9 °C)-98.6 °F (37 °C)] 98.2 °F (36.8 °C)  Temp src: Oral  BP:  BP: (100-122)/(56-69) 110/69  Pulse:  Heart Rate:  [57-76] 67  RR:   Resp:  [16-19] 16  Weight: 92.1 kg (203 lb)  BMI:  Body mass index is 34.84 kg/m².      General Awake, alert, no distress  Abdomen Soft, non-distended, fundus firm, 2 cm below umbilicus, appropriately tender  Incision  Intact, no erythema or exudate, bandage removed  Extremities Calves NT bilaterally         I/O last 3 completed shifts:  In: 2248 [P.O.:650; I.V.:1498; IV Piggyback:100]  Out: 1720 [Urine:1125; Blood:595]    LABS:   Lab Results   Component Value Date    WBC 8.65 2021    HGB 9.1 (L) 2021    HCT 27.2 (L) 2021    MCV 86.1 2021     2021       Infant: female       Assessment   1.  POD 1    Plan: Doing well.  Routine postoperative care      Maternal care for breech presentation, single gestation    Rubella non-immune status, antepartum    Maternal varicella, non-immune    Rh negative status during pregnancy    Maternal anemia in pregnancy, antepartum    Breech presentation of fetus      Edwige  MD Consuelo  6/24/2021 11:46 EDT

## 2021-06-25 VITALS
BODY MASS INDEX: 34.66 KG/M2 | RESPIRATION RATE: 18 BRPM | SYSTOLIC BLOOD PRESSURE: 107 MMHG | OXYGEN SATURATION: 99 % | DIASTOLIC BLOOD PRESSURE: 74 MMHG | WEIGHT: 203 LBS | HEIGHT: 64 IN | HEART RATE: 79 BPM | TEMPERATURE: 98.4 F

## 2021-06-25 RX ORDER — OXYCODONE HYDROCHLORIDE AND ACETAMINOPHEN 5; 325 MG/1; MG/1
2 TABLET ORAL EVERY 6 HOURS PRN
Qty: 20 TABLET | Refills: 0 | Status: SHIPPED | OUTPATIENT
Start: 2021-06-25 | End: 2021-06-28

## 2021-06-25 RX ORDER — IBUPROFEN 600 MG/1
600 TABLET ORAL EVERY 6 HOURS PRN
Qty: 30 TABLET | Refills: 0 | Status: SHIPPED | OUTPATIENT
Start: 2021-06-25 | End: 2021-07-06

## 2021-06-25 RX ADMIN — IBUPROFEN 600 MG: 600 TABLET, FILM COATED ORAL at 05:41

## 2021-06-25 RX ADMIN — OXYCODONE AND ACETAMINOPHEN 2 TABLET: 5; 325 TABLET ORAL at 05:41

## 2021-06-25 NOTE — PLAN OF CARE
Problem: Adult Inpatient Plan of Care  Goal: Plan of Care Review  Outcome: Met  Flowsheets (Taken 2021 1258)  Progress: improving  Plan of Care Reviewed With: patient  Outcome Summary: pt vss, pain well controlled with motrin and percocet. discharge home today.  Goal: Patient-Specific Goal (Individualized)  Outcome: Met  Goal: Absence of Hospital-Acquired Illness or Injury  Outcome: Met  Intervention: Identify and Manage Fall Risk  Recent Flowsheet Documentation  Taken 2021 1220 by Porsche Calabrese RN  Safety Promotion/Fall Prevention: safety round/check completed  Taken 2021 1005 by Porsche Calabrese RN  Safety Promotion/Fall Prevention: safety round/check completed  Goal: Optimal Comfort and Wellbeing  Outcome: Met  Goal: Readiness for Transition of Care  Outcome: Met     Problem: Respiratory Compromise ( Delivery)  Goal: Effective Oxygenation and Ventilation  Outcome: Met     Problem: Device-Related Complication Risk (Anesthesia/Analgesia, Neuraxial)  Goal: Safe Infusion Delivery Completion  Outcome: Met     Problem: Infection (Anesthesia/Analgesia, Neuraxial)  Goal: Absence of Infection Signs and Symptoms  Outcome: Met     Problem: Nausea and Vomiting (Anesthesia/Analgesia, Neuraxial)  Goal: Nausea and Vomiting Relief  Outcome: Met     Problem: Pain (Anesthesia/Analgesia, Neuraxial)  Goal: Effective Pain Control  Outcome: Met     Problem: Respiratory Compromise (Anesthesia/Analgesia, Neuraxial)  Goal: Effective Oxygenation and Ventilation  Outcome: Met     Problem: Sensorimotor Impairment (Anesthesia/Analgesia, Neuraxial)  Goal: Baseline Motor Function  Outcome: Met  Intervention: Optimize Sensorimotor Function  Recent Flowsheet Documentation  Taken 2021 1220 by Porsche Calabrese RN  Safety Promotion/Fall Prevention: safety round/check completed  Taken 2021 1005 by Porsche Calabrese RN  Safety Promotion/Fall Prevention: safety round/check completed     Problem: Urinary  Retention (Anesthesia/Analgesia, Neuraxial)  Goal: Effective Urinary Elimination  Outcome: Met     Problem:  Fall Injury Risk  Goal: Absence of Fall, Infant Drop and Related Injury  Outcome: Met  Intervention: Promote Injury-Free Environment  Recent Flowsheet Documentation  Taken 2021 1220 by Porsche Calabrese, RN  Safety Promotion/Fall Prevention: safety round/check completed  Taken 2021 1005 by Porsche Calabrese, RN  Safety Promotion/Fall Prevention: safety round/check completed     Problem: Adjustment to Role Transition (Postpartum  Delivery)  Goal: Successful Maternal Role Transition  Outcome: Met     Problem: Bleeding (Postpartum  Delivery)  Goal: Hemostasis  Outcome: Met     Problem: Infection (Postpartum  Delivery)  Goal: Absence of Infection Signs and Symptoms  Outcome: Met     Problem: Pain (Postpartum  Delivery)  Goal: Acceptable Pain Control  Outcome: Met     Problem: Postoperative Nausea and Vomiting (Postpartum  Delivery)  Goal: Nausea and Vomiting Relief  Outcome: Met     Problem: Postoperative Urinary Retention (Postpartum  Delivery)  Goal: Effective Urinary Elimination  Outcome: Met   Goal Outcome Evaluation:  Plan of Care Reviewed With: patient        Progress: improving  Outcome Summary: pt vss, pain well controlled with motrin and percocet. discharge home today.

## 2021-06-25 NOTE — PLAN OF CARE
Goal Outcome Evaluation:              Outcome Summary: stable. pain controlled with po pain med. Bottle feeding. Questions answered. no c/o or concerns voiced at present.

## 2021-06-25 NOTE — DISCHARGE SUMMARY
[facility]  Delivery Discharge Summary    Primary OB Clinician:     EDC: Estimated Date of Delivery: 21    Gestational Age:39w3d    Antepartum complications/ problem list: .  Patient Active Problem List   Diagnosis   • Supervision of normal first pregnancy, antepartum   • Rubella non-immune status, antepartum   • Maternal varicella, non-immune   • Rh negative status during pregnancy   • BV (bacterial vaginosis)   • Nausea and vomiting during pregnancy   • Obesity in pregnancy, antepartum   • Maternal anemia in pregnancy, antepartum   • Low weight gain during pregnancy in third trimester   • Maternal care for breech presentation, single gestation   • Breech presentation of fetus   •  delivery delivered       Date of Delivery: 2021   Time of Delivery: 12:25 PM     Delivered By:  Skylar Singh     Delivery Type: , Low Transverse      Tubal Ligation: n/a    Baby:female  infant;   Apgar:  8  @ 1 minute /   Apgar:  9  @ 5 minutes   Weight: 3335 g (7 lb 5.6 oz)    Length: 20.5     Anesthesia: Spinal      Intrapartum complications: None      Placenta: Expressed     Feeding method: Breastfeeding Status: No    Rh Immune globulin given: yes    Rubella vaccine given: ordered for administration prior to discharge, pt counseled    Discharge Date: 2021; Discharge Time: 09:50 EDT        Plan:    Follow-up appointment with Dr. Singh in 1 week.

## 2021-06-25 NOTE — PROGRESS NOTES
[facility]   PROGRESS NOTE    Post-Op Day 2 S/P   Subjective     Patient reports:  Pain is well controlled.  She is ambulating. Tolerating diet. Tolerating po -- normal.  Intake -- c/o of tolerating po solids.   Voiding - without difficulty; flatus reported.  Vaginal bleeding is light.    ROS:  Pulm: neg for shortness of air  GI: neg for n/v  : neg for heavy bleeding  Musculoskeletal: neg for leg pain    Objective      Vitals: Vital Signs Range for the last 24 hours  Temperature: Temp:  [97.8 °F (36.6 °C)-98.4 °F (36.9 °C)] 98.4 °F (36.9 °C)   Temp Source: Temp src: Oral   BP: BP: (101-109)/(66-74) 107/74   Pulse: Heart Rate:  [68-79] 79   Respirations: Resp:  [16-18] 18   SPO2: SpO2:  [99 %] 99 %   O2 Amount (l/min):     O2 Devices Device (Oxygen Therapy): room air   Weight:              Physical Exam    Lungs clear to auscultation bilaterally   Abdomen Fundus firm at U-1   Incision  no drainage, no erythema, no swelling, well approximated   Extremities bilateral lower extremities with trace edema, no cords or tenderness     labs:  Lab Results   Component Value Date    WBC 8.65 2021    HGB 9.1 (L) 2021    HCT 27.2 (L) 2021    MCV 86.1 2021     2021     Results from last 7 days   Lab Units 21  1415   ABO TYPING  O   RH TYPING  Negative     Assessment/Plan        Maternal care for breech presentation, single gestation    Rubella non-immune status, antepartum    Maternal varicella, non-immune    Rh negative status during pregnancy    Maternal anemia in pregnancy, antepartum    Breech presentation of fetus      Assessment:    Madie Givens is Day 2  post-partum  , Low Transverse : pt desires to proceed with discharge home today. All instructions reviewed.     Rh negative: pt received rhogam postpartum     Rubella non-imune: vaccine has been ordered to be given prior to discharge and pt counseled and plans to proceed     Plan:  plan for  discharge today.        Leela Anthony MD  6/25/2021  09:36 EDT

## 2021-07-06 ENCOUNTER — POSTPARTUM VISIT (OUTPATIENT)
Dept: OBSTETRICS AND GYNECOLOGY | Age: 21
End: 2021-07-06

## 2021-07-06 VITALS
DIASTOLIC BLOOD PRESSURE: 70 MMHG | SYSTOLIC BLOOD PRESSURE: 110 MMHG | HEIGHT: 64 IN | BODY MASS INDEX: 31.92 KG/M2 | WEIGHT: 187 LBS

## 2021-07-06 PROBLEM — O21.9 NAUSEA AND VOMITING DURING PREGNANCY: Status: RESOLVED | Noted: 2020-12-22 | Resolved: 2021-07-06

## 2021-07-06 PROBLEM — O26.899 RH NEGATIVE STATUS DURING PREGNANCY: Status: RESOLVED | Noted: 2020-12-17 | Resolved: 2021-07-06

## 2021-07-06 PROBLEM — Z67.91 RH NEGATIVE STATUS DURING PREGNANCY: Status: RESOLVED | Noted: 2020-12-17 | Resolved: 2021-07-06

## 2021-07-06 PROBLEM — Z28.39 MATERNAL VARICELLA, NON-IMMUNE: Status: RESOLVED | Noted: 2020-12-17 | Resolved: 2021-07-06

## 2021-07-06 PROBLEM — N76.0 BV (BACTERIAL VAGINOSIS): Status: RESOLVED | Noted: 2020-12-17 | Resolved: 2021-07-06

## 2021-07-06 PROBLEM — Z28.39 RUBELLA NON-IMMUNE STATUS, ANTEPARTUM: Status: RESOLVED | Noted: 2020-12-17 | Resolved: 2021-07-06

## 2021-07-06 PROBLEM — Z34.00 SUPERVISION OF NORMAL FIRST PREGNANCY, ANTEPARTUM: Status: RESOLVED | Noted: 2020-12-02 | Resolved: 2021-07-06

## 2021-07-06 PROBLEM — O26.13 LOW WEIGHT GAIN DURING PREGNANCY IN THIRD TRIMESTER: Status: RESOLVED | Noted: 2021-04-20 | Resolved: 2021-07-06

## 2021-07-06 PROBLEM — O09.899 RUBELLA NON-IMMUNE STATUS, ANTEPARTUM: Status: RESOLVED | Noted: 2020-12-17 | Resolved: 2021-07-06

## 2021-07-06 PROBLEM — O99.210 OBESITY IN PREGNANCY, ANTEPARTUM: Status: RESOLVED | Noted: 2021-01-26 | Resolved: 2021-07-06

## 2021-07-06 PROBLEM — O09.899 MATERNAL VARICELLA, NON-IMMUNE: Status: RESOLVED | Noted: 2020-12-17 | Resolved: 2021-07-06

## 2021-07-06 PROBLEM — B96.89 BV (BACTERIAL VAGINOSIS): Status: RESOLVED | Noted: 2020-12-17 | Resolved: 2021-07-06

## 2021-07-06 PROBLEM — O99.019 MATERNAL ANEMIA IN PREGNANCY, ANTEPARTUM: Status: RESOLVED | Noted: 2021-03-24 | Resolved: 2021-07-06

## 2021-07-06 PROCEDURE — 0503F POSTPARTUM CARE VISIT: CPT | Performed by: OBSTETRICS & GYNECOLOGY

## 2021-07-06 NOTE — PROGRESS NOTES
"Subjective   Madie Givens is a 21 y.o. female who presents for a postpartum visit. She is 2 weeks postpartum following a low cervical transverse  section. I have fully reviewed the prenatal and intrapartum course. The delivery was at 39-3 gestational weeks. Outcome: primary  section, low transverse incision. Anesthesia: spinal. Postpartum course has been uncomplicated. Baby's course has been uncomplicated. Baby is feeding by bottle. Bleeding thin lochia. Bowel function is normal. Bladder function is normal. Patient is not sexually active. Contraception method is OCP (estrogen/progesterone). Postpartum depression screening: negative.    The following portions of the patient's history were reviewed and updated as appropriate: allergies, current medications, past family history, past medical history, past social history, past surgical history and problem list.    Review of Systems  Pertinent items are noted in HPI.    Objective   /70   Ht 162.6 cm (64\")   Wt 84.8 kg (187 lb)   LMP  (LMP Unknown)   Breastfeeding No   BMI 32.10 kg/m²    General:  alert, appears stated age and cooperative   Abdomen: soft, non-tender; bowel sounds normal; no masses,  no organomegaly and Inc C/D/I      Assessment/Plan   postpartum exam. Pap smear not done at today's visit.    1. Contraception: OCP (estrogen/progesterone)  2. Inc healed well   3. Follow up in: 4 weeks or as needed.           "

## 2021-08-10 ENCOUNTER — POSTPARTUM VISIT (OUTPATIENT)
Dept: OBSTETRICS AND GYNECOLOGY | Age: 21
End: 2021-08-10

## 2021-08-10 VITALS
HEIGHT: 64 IN | DIASTOLIC BLOOD PRESSURE: 70 MMHG | BODY MASS INDEX: 32.78 KG/M2 | WEIGHT: 192 LBS | SYSTOLIC BLOOD PRESSURE: 110 MMHG

## 2021-08-10 DIAGNOSIS — Z30.015 ENCOUNTER FOR INITIAL PRESCRIPTION OF VAGINAL RING HORMONAL CONTRACEPTIVE: ICD-10-CM

## 2021-08-10 PROCEDURE — 99213 OFFICE O/P EST LOW 20 MIN: CPT | Performed by: OBSTETRICS & GYNECOLOGY

## 2021-08-10 RX ORDER — ETONOGESTREL AND ETHINYL ESTRADIOL 11.7; 2.7 MG/1; MG/1
1 INSERT, EXTENDED RELEASE VAGINAL
Qty: 3 EACH | Refills: 3 | Status: SHIPPED | OUTPATIENT
Start: 2021-08-10 | End: 2022-02-23 | Stop reason: SDUPTHER

## 2021-08-10 NOTE — PROGRESS NOTES
"Subjective   Madie Givens is a 21 y.o. female who presents for a postpartum visit. She is 6 weeks postpartum following a low cervical transverse  section. I have fully reviewed the prenatal and intrapartum course. The delivery was at 39-3 gestational weeks. Outcome: primary  section, low vertical incision. Anesthesia: spinal. Postpartum course has been uncomplicated. Baby's course has been uncomplicated. Baby is feeding by bottle. Bleeding no bleeding. Bowel function is normal. Bladder function is normal. Patient is not sexually active. Contraception method is NuvaRing vaginal inserts. Postpartum depression screening: negative.    The following portions of the patient's history were reviewed and updated as appropriate: allergies, current medications, past family history, past medical history, past social history, past surgical history and problem list.    Review of Systems  Pertinent items are noted in HPI.    Objective   /70   Ht 162.6 cm (64\")   Wt 87.1 kg (192 lb)   LMP 2021   Breastfeeding No   BMI 32.96 kg/m²    General:  alert, appears stated age and cooperative   Abdomen: soft, non-tender; bowel sounds normal; no masses,  no organomegaly and Inc healed well      Assessment/Plan   postpartum exam. Pap smear not done at today's visit.    1. Contraception: NuvaRing vaginal inserts  2.  Follow up in: 5 months or as needed.           "

## 2021-09-03 ENCOUNTER — TELEPHONE (OUTPATIENT)
Dept: OBSTETRICS AND GYNECOLOGY | Age: 21
End: 2021-09-03

## 2021-09-03 NOTE — TELEPHONE ENCOUNTER
Patient called and states she has a couple questions concerning the birth control she is currently prescribed (NuvaRing).  States she was told she will not have periods on the Nuva Ring however the past couple days she has been bleeding and wants to know why?  She states the bleeding is not heavy heavy but it is also not only spotting, not passing any clots.  Pharmacy has been verified with patient.

## 2021-10-08 ENCOUNTER — TELEPHONE (OUTPATIENT)
Dept: OBSTETRICS AND GYNECOLOGY | Age: 21
End: 2021-10-08

## 2021-10-08 NOTE — TELEPHONE ENCOUNTER
Patient called and states she called this morning in distress and was told to go to [facility] for Psychiatric assistance and she had no one to watch her baby so she was not able to go; however, she made contact with [facility] and they scheduled her to be evaluated next week.  She denies SI or HI.  She states she just had a brief moment of several emotions at one time.  She just wanted you to know this. She is scheduled to see Dr. Singh on October 19th.

## 2021-10-08 NOTE — TELEPHONE ENCOUNTER
Patient called and states she is having severe postpartum depression.  She states she has had ideas of harming herself but not the baby.  I spoke with Dr. Garibay and she advised her to go to [facility] to be evaluated.  She states she is going there now.

## 2021-10-14 NOTE — TELEPHONE ENCOUNTER
No ans, mailbox full.dn Mom is her emergency contact so left message on moms phone to have pt call me.dn

## 2022-02-23 ENCOUNTER — OFFICE VISIT (OUTPATIENT)
Dept: OBSTETRICS AND GYNECOLOGY | Age: 22
End: 2022-02-23

## 2022-02-23 VITALS
DIASTOLIC BLOOD PRESSURE: 74 MMHG | HEIGHT: 64 IN | WEIGHT: 207.6 LBS | BODY MASS INDEX: 35.44 KG/M2 | SYSTOLIC BLOOD PRESSURE: 116 MMHG

## 2022-02-23 DIAGNOSIS — Z30.015 ENCOUNTER FOR INITIAL PRESCRIPTION OF VAGINAL RING HORMONAL CONTRACEPTIVE: ICD-10-CM

## 2022-02-23 DIAGNOSIS — Z01.419 WELL WOMAN EXAM WITH ROUTINE GYNECOLOGICAL EXAM: Primary | ICD-10-CM

## 2022-02-23 PROCEDURE — 99395 PREV VISIT EST AGE 18-39: CPT | Performed by: NURSE PRACTITIONER

## 2022-02-23 PROCEDURE — 3008F BODY MASS INDEX DOCD: CPT | Performed by: NURSE PRACTITIONER

## 2022-02-23 RX ORDER — ETONOGESTREL AND ETHINYL ESTRADIOL 11.7; 2.7 MG/1; MG/1
1 INSERT, EXTENDED RELEASE VAGINAL
Qty: 3 EACH | Refills: 3 | Status: SHIPPED | OUTPATIENT
Start: 2022-02-23 | End: 2023-03-03 | Stop reason: SDUPTHER

## 2022-02-23 NOTE — PROGRESS NOTES
[facility]  Routine Annual Visit    2022    Patient: Madie Givens          MR#:[id number]      History of Present Illness    21 y.o. female  who presents for annual exam.    nuvaring is going well- desires to stay on this form of contraception - reports compliance and good cycle control    She reports moods have greatly improved over the past several months after getting established with a therapist.     She does request to recheck for anemia- c/o fatigue      No LMP recorded (lmp unknown). (Menstrual status: Other).  Obstetric History:  OB History        1    Para   1    Term   1       0    AB   0    Living   1       SAB   0    IAB   0    Ectopic   0    Molar   0    Multiple   0    Live Births   1               Menstrual History:     No LMP recorded (lmp unknown). (Menstrual status: Other).       Sexual History:       ________________________________________  Patient Active Problem List   Diagnosis   (none) - all problems resolved or deleted       Past Medical History:   Diagnosis Date   • Asthma     childhood       Past Surgical History:   Procedure Laterality Date   •  SECTION N/A 2021    Procedure:  SECTION PRIMARY;  Surgeon: Skylar Singh MD;  Location: [facility] LABOR DELIVERY;  Service: Obstetrics/Gynecology;  Laterality: N/A;       Social History     Tobacco Use   Smoking Status Never Smoker   Smokeless Tobacco Never Used       Family History   Problem Relation Age of Onset   • No Known Problems Mother    • No Known Problems Father        Prior to Admission medications    Medication Sig Start Date End Date Taking? Authorizing Provider   etonogestrel-ethinyl estradiol (NuvaRing) 0.12-0.015 MG/24HR vaginal ring Insert 1 each into the vagina Every 28 (Twenty-Eight) Days. 21 Yes Caitlin Mcdonald APRN   Prenatal Vit-Fe Fumarate-FA (prenatal vitamin 27-0.8) 27-0.8 MG tablet tablet Take 1 tablet by mouth Daily.   Yes Provider,  "MD John   etonogestrel-ethinyl estradiol (NuvaRing) 0.12-0.015 MG/24HR vaginal ring Insert 1 each into the vagina Every 28 (Twenty-Eight) Days. 8/10/21 2/23/22 Yes Skylar Singh MD   ferrous sulfate 325 (65 FE) MG tablet Take 1 tablet by mouth 2 (two) times a day. 3/24/21   Skylar Singh MD     ________________________________________    The following portions of the patient's history were reviewed and updated as appropriate: allergies, current medications, past family history, past medical history, past social history, past surgical history and problem list.    Review of Systems   Constitutional: Negative.    HENT: Negative.    Eyes: Negative for visual disturbance.   Respiratory: Negative for cough, shortness of breath and wheezing.    Cardiovascular: Negative for chest pain, palpitations and leg swelling.   Gastrointestinal: Negative for abdominal distention, abdominal pain, blood in stool, constipation, diarrhea, nausea and vomiting.   Endocrine: Negative for cold intolerance and heat intolerance.   Genitourinary: Negative for difficulty urinating, dyspareunia, dysuria, frequency, genital sores, hematuria, menstrual problem, pelvic pain, urgency, vaginal bleeding, vaginal discharge and vaginal pain.   Musculoskeletal: Negative.    Skin: Negative.    Neurological: Negative for dizziness, weakness, light-headedness, numbness and headaches.   Hematological: Negative.    Psychiatric/Behavioral: Negative.    Breasts: negative for lumps skin changes, dimpling, swelling, nipple changes/discharge bilaterally         Objective   Physical Exam    /74   Ht 162.6 cm (64\")   Wt 94.2 kg (207 lb 9.6 oz)   LMP  (LMP Unknown) Comment: Nuvaring  Breastfeeding No   BMI 35.63 kg/m²    BP Readings from Last 3 Encounters:   02/23/22 116/74   08/10/21 110/70   07/06/21 110/70      Wt Readings from Last 3 Encounters:   02/23/22 94.2 kg (207 lb 9.6 oz)   08/10/21 87.1 kg (192 lb)   07/06/21 84.8 kg (187 " "lb)        BMI: Estimated body mass index is 35.63 kg/m² as calculated from the following:    Height as of this encounter: 162.6 cm (64\").    Weight as of this encounter: 94.2 kg (207 lb 9.6 oz).            General:   alert, appears stated age and cooperative   Heart: regular rate and rhythm, S1, S2 normal, no murmur, click, rub or gallop   Lungs: clear to auscultation bilaterally   Abdomen: soft, non-tender, without masses or organomegaly   Breast: inspection negative, no nipple discharge or bleeding, no masses or nodularity palpable   Vulva: External genitalia including bartholin's glands, Urethra, [place]'s gland and urethra meatus are normal, Perineum, rectum and anus appear normal  and Bladder appears normal without significant prolapse    Vagina: normal mucosa, normal discharge   Cervix: no cervical motion tenderness and no lesions   Uterus: normal size, mobile, non-tender and normal shape and consistency   Adnexa: no mass, fullness, tenderness     Assessment:    Diagnoses and all orders for this visit:    1. Well woman exam with routine gynecological exam (Primary)  -     CBC (No Diff)  -     TSH  -     IGP,CtNgTv,rfx Aptima HPV ASCU    2. Encounter for initial prescription of vaginal ring hormonal contraceptive  -     etonogestrel-ethinyl estradiol (NuvaRing) 0.12-0.015 MG/24HR vaginal ring; Insert 1 each into the vagina Every 28 (Twenty-Eight) Days.  Dispense: 3 each; Refill: 3      Counseled on healthy lifestyle modifications, safe sex, condom use, and self breast exams.    All of the patient's questions were addressed and answered, I have encouraged her to call for today's test results if she has not received them within 10 days.  Patient is advised to call with any change in her condition or with any other questions, otherwise return in 12 months for annual examination.    Caitlin Mcdonald, APRN  2/23/2022 14:48 EST  "

## 2022-02-24 LAB
ERYTHROCYTE [DISTWIDTH] IN BLOOD BY AUTOMATED COUNT: 15 % (ref 11.7–15.4)
HCT VFR BLD AUTO: 33.2 % (ref 34–46.6)
HGB BLD-MCNC: 10.5 G/DL (ref 11.1–15.9)
MCH RBC QN AUTO: 24.2 PG (ref 26.6–33)
MCHC RBC AUTO-ENTMCNC: 31.6 G/DL (ref 31.5–35.7)
MCV RBC AUTO: 77 FL (ref 79–97)
PLATELET # BLD AUTO: 365 X10E3/UL (ref 150–450)
RBC # BLD AUTO: 4.34 X10E6/UL (ref 3.77–5.28)
TSH SERPL DL<=0.005 MIU/L-ACNC: 4.92 UIU/ML (ref 0.45–4.5)
WBC # BLD AUTO: 5.9 X10E3/UL (ref 3.4–10.8)

## 2022-02-26 LAB
C TRACH RRNA CVX QL NAA+PROBE: NEGATIVE
CONV .: NORMAL
CYTOLOGIST CVX/VAG CYTO: NORMAL
CYTOLOGY CVX/VAG DOC CYTO: NORMAL
CYTOLOGY CVX/VAG DOC THIN PREP: NORMAL
DX ICD CODE: NORMAL
HIV 1 & 2 AB SER-IMP: NORMAL
N GONORRHOEA RRNA CVX QL NAA+PROBE: NEGATIVE
OTHER STN SPEC: NORMAL
STAT OF ADQ CVX/VAG CYTO-IMP: NORMAL
T VAGINALIS RRNA SPEC QL NAA+PROBE: NEGATIVE

## 2022-03-07 PROBLEM — R79.89 ELEVATED TSH: Status: ACTIVE | Noted: 2022-03-07

## 2022-03-07 RX ORDER — FERROUS SULFATE 325(65) MG
325 TABLET ORAL
Qty: 30 TABLET | Refills: 3 | Status: SHIPPED | OUTPATIENT
Start: 2022-03-07 | End: 2022-04-15

## 2022-03-10 PROBLEM — D64.9 ANEMIA, UNSPECIFIED: Status: ACTIVE | Noted: 2022-03-10

## 2022-04-13 ENCOUNTER — OFFICE VISIT (OUTPATIENT)
Dept: FAMILY MEDICINE CLINIC | Facility: CLINIC | Age: 22
End: 2022-04-13

## 2022-04-13 VITALS
DIASTOLIC BLOOD PRESSURE: 68 MMHG | HEIGHT: 64 IN | WEIGHT: 203.6 LBS | BODY MASS INDEX: 34.76 KG/M2 | HEART RATE: 78 BPM | TEMPERATURE: 98.2 F | SYSTOLIC BLOOD PRESSURE: 114 MMHG | OXYGEN SATURATION: 99 %

## 2022-04-13 DIAGNOSIS — D64.9 ANEMIA, UNSPECIFIED TYPE: ICD-10-CM

## 2022-04-13 DIAGNOSIS — R79.89 ABNORMAL THYROID STIMULATING HORMONE (TSH) LEVEL: Primary | ICD-10-CM

## 2022-04-13 PROCEDURE — 99213 OFFICE O/P EST LOW 20 MIN: CPT | Performed by: NURSE PRACTITIONER

## 2022-04-13 RX ORDER — ALBUTEROL SULFATE 90 UG/1
2 AEROSOL, METERED RESPIRATORY (INHALATION) EVERY 4 HOURS PRN
COMMUNITY

## 2022-04-13 NOTE — PROGRESS NOTES
"Chief Complaint  Establish Care and Hypothyroidism    Subjective          Madie Givens presents to [facility]  History of Present Illness     Patient is here today to establish care as a new patient.  She didn't have PCP previously.      Was just told she had abnormal thyroid with ob/gyn 2/23/2022     Objective   Vital Signs:   /68   Pulse 78   Temp 98.2 °F (36.8 °C)   Ht 162.6 cm (64.02\")   Wt 92.4 kg (203 lb 9.6 oz)   SpO2 99%   BMI 34.93 kg/m²            Physical Exam  Constitutional:       Appearance: Normal appearance.   Neck:      Thyroid: No thyroid mass, thyromegaly or thyroid tenderness.   Cardiovascular:      Rate and Rhythm: Normal rate and regular rhythm.      Pulses: Normal pulses.   Pulmonary:      Effort: Pulmonary effort is normal.      Breath sounds: Normal breath sounds.   Skin:     General: Skin is warm and dry.   Neurological:      Mental Status: She is alert.   Psychiatric:         Mood and Affect: Mood normal.         Behavior: Behavior normal.         Thought Content: Thought content normal.         Judgment: Judgment normal.        Result Review :                 Assessment and Plan    Diagnoses and all orders for this visit:    1. Abnormal thyroid stimulating hormone (TSH) level (Primary)  -     CBC & Differential  -     TSH  -     Ferritin  -     Iron and TIBC  -     T3, free  -     T4, free  -     Thyroid Stimulating Immunoglobulin    2. Anemia, unspecified type  -     CBC & Differential  -     TSH  -     Ferritin  -     Iron and TIBC  -     T3, free  -     T4, free  -     Thyroid Stimulating Immunoglobulin      We will recheck anemia and iron and ferritin level today.  Will call with results any changes needed plan of care.    We will check labs today for thyroid levels and call with results and any changes needed plan of care.    Follow-up with me as needed.  She verbalizes understanding.      Follow Up   No follow-ups on file.  Patient was " given instructions and counseling regarding her condition or for health maintenance advice. Please see specific information pulled into the AVS if appropriate.

## 2022-04-15 LAB
BASOPHILS # BLD AUTO: 0 X10E3/UL (ref 0–0.2)
BASOPHILS NFR BLD AUTO: 0 %
EOSINOPHIL # BLD AUTO: 0 X10E3/UL (ref 0–0.4)
EOSINOPHIL NFR BLD AUTO: 1 %
ERYTHROCYTE [DISTWIDTH] IN BLOOD BY AUTOMATED COUNT: 14.6 % (ref 11.7–15.4)
FERRITIN SERPL-MCNC: 5 NG/ML (ref 15–150)
HCT VFR BLD AUTO: 33.6 % (ref 34–46.6)
HGB BLD-MCNC: 10.5 G/DL (ref 11.1–15.9)
IMM GRANULOCYTES # BLD AUTO: 0 X10E3/UL (ref 0–0.1)
IMM GRANULOCYTES NFR BLD AUTO: 0 %
IRON SATN MFR SERPL: 23 % (ref 15–55)
IRON SERPL-MCNC: 97 UG/DL (ref 27–159)
LYMPHOCYTES # BLD AUTO: 1 X10E3/UL (ref 0.7–3.1)
LYMPHOCYTES NFR BLD AUTO: 20 %
MCH RBC QN AUTO: 24.2 PG (ref 26.6–33)
MCHC RBC AUTO-ENTMCNC: 31.3 G/DL (ref 31.5–35.7)
MCV RBC AUTO: 78 FL (ref 79–97)
MONOCYTES # BLD AUTO: 0.3 X10E3/UL (ref 0.1–0.9)
MONOCYTES NFR BLD AUTO: 7 %
NEUTROPHILS # BLD AUTO: 3.5 X10E3/UL (ref 1.4–7)
NEUTROPHILS NFR BLD AUTO: 72 %
PLATELET # BLD AUTO: 318 X10E3/UL (ref 150–450)
RBC # BLD AUTO: 4.33 X10E6/UL (ref 3.77–5.28)
T3FREE SERPL-MCNC: 2.8 PG/ML (ref 2–4.4)
T4 FREE SERPL-MCNC: 1.2 NG/DL (ref 0.82–1.77)
TIBC SERPL-MCNC: 426 UG/DL (ref 250–450)
TSH SERPL DL<=0.005 MIU/L-ACNC: 3.14 UIU/ML (ref 0.45–4.5)
TSI SER-ACNC: 0.1 IU/L (ref 0–0.55)
UIBC SERPL-MCNC: 329 UG/DL (ref 131–425)
WBC # BLD AUTO: 4.9 X10E3/UL (ref 3.4–10.8)

## 2022-04-15 RX ORDER — FERROUS SULFATE 325(65) MG
325 TABLET ORAL 2 TIMES DAILY
Qty: 60 TABLET | Refills: 2 | Status: SHIPPED | OUTPATIENT
Start: 2022-04-15

## 2022-04-15 NOTE — PROGRESS NOTES
I would like her to increase iron it to twice daily.  Would like to recheck labs and have her follow-up with me in 4 to 6 weeks.

## 2022-04-15 NOTE — PROGRESS NOTES
Please call the patient regarding her lab result.  Thyroid levels within normal limits.  Patient's anemia level is the same as in February.  Has she been taking her iron supplement daily?

## 2022-04-21 ENCOUNTER — PATIENT ROUNDING (BHMG ONLY) (OUTPATIENT)
Dept: FAMILY MEDICINE CLINIC | Facility: CLINIC | Age: 22
End: 2022-04-21

## 2022-04-21 NOTE — PROGRESS NOTES
Sent Patient following in [employer] Message:  My name is Anurag Neves      I am the Practice Manager with   [facility] [address]  [facility] [address] [phone].      I am messaging to officially welcome you to our practice and ask about your recent visit.     Tell me about your visit with us. What things went well?         We're always looking for ways to make our patients' experiences even better. Do you have recommendations on ways we may improve?       Overall were you satisfied with your first visit to our practice?        Is there anything else I can do for you?       Thank you, and have a great day.

## 2022-06-09 ENCOUNTER — TELEPHONE (OUTPATIENT)
Dept: OBSTETRICS AND GYNECOLOGY | Age: 22
End: 2022-06-09

## 2022-06-09 NOTE — TELEPHONE ENCOUNTER
Patient called stating she is using the Nuva Ring and has been bleeding extremely heavy for the past week.  She is not changing her sanitation napkins every hour.  She states she is changing every 2-3 hours.  She would like you to call her because she has a few questions concerning this issue.

## 2022-06-09 NOTE — TELEPHONE ENCOUNTER
Spoke with patient and scheduled her with EP on Wednesday, June 15th for evaluation.  Patient was instructed to go to the ER for evaluation if bleeding gets heavier and she is changing her sanitation napkin every hour.

## 2022-06-15 ENCOUNTER — OFFICE VISIT (OUTPATIENT)
Dept: OBSTETRICS AND GYNECOLOGY | Age: 22
End: 2022-06-15

## 2022-06-15 VITALS
HEIGHT: 64 IN | SYSTOLIC BLOOD PRESSURE: 122 MMHG | DIASTOLIC BLOOD PRESSURE: 66 MMHG | BODY MASS INDEX: 34.49 KG/M2 | WEIGHT: 202 LBS

## 2022-06-15 DIAGNOSIS — N92.6 IRREGULAR MENSES: Primary | ICD-10-CM

## 2022-06-15 PROCEDURE — 99213 OFFICE O/P EST LOW 20 MIN: CPT | Performed by: NURSE PRACTITIONER

## 2022-06-15 NOTE — PROGRESS NOTES
Subjective   Hayden Givens is a 22 y.o. female.     History of Present Illness     Hayden presents with complaint of irregular bleeding on nuvaring    She started on nuvaring in February and up until recently has done well. She prefers this method of contraception.    She began bleeding the week before she was due to take her nuvaring out and has bled x 10 days. She took her nuvaring out this past Sunday but did not replace it.     She is anemic and is taking iron BID- will recheck levels today.  Not sexually active so need for STI screen per patient    The following portions of the patient's history were reviewed and updated as appropriate: allergies, current medications, past family history, past medical history, past social history, past surgical history and problem list.    Review of Systems   Constitutional: Negative for chills, fatigue and fever.   Gastrointestinal: Negative for abdominal distention and abdominal pain.   Genitourinary: Positive for menstrual problem. Negative for decreased urine volume, difficulty urinating, dyspareunia, dysuria, frequency, genital sores, hematuria, pelvic pain, pelvic pressure, urgency, urinary incontinence, vaginal bleeding, vaginal discharge and vaginal pain.       Objective   Physical Exam  Constitutional:       Appearance: Normal appearance. She is not ill-appearing.   Skin:     General: Skin is warm and dry.   Neurological:      General: No focal deficit present.      Mental Status: She is alert and oriented to person, place, and time.   Psychiatric:         Mood and Affect: Mood normal.         Behavior: Behavior normal.           Assessment & Plan   Diagnoses and all orders for this visit:    1. Irregular menses (Primary)  -     TSH  -     CBC & Differential      Labs today. Recommend placing new nuvaring in today. Call if bleeding worsens or continues. hayden agrees with plan.    Caitlin Mcdonald, FRANK

## 2022-06-16 LAB
BASOPHILS # BLD AUTO: 0 X10E3/UL (ref 0–0.2)
BASOPHILS NFR BLD AUTO: 0 %
EOSINOPHIL # BLD AUTO: 0 X10E3/UL (ref 0–0.4)
EOSINOPHIL NFR BLD AUTO: 1 %
ERYTHROCYTE [DISTWIDTH] IN BLOOD BY AUTOMATED COUNT: 15.4 % (ref 11.7–15.4)
HCT VFR BLD AUTO: 34.2 % (ref 34–46.6)
HGB BLD-MCNC: 10.4 G/DL (ref 11.1–15.9)
IMM GRANULOCYTES # BLD AUTO: 0 X10E3/UL (ref 0–0.1)
IMM GRANULOCYTES NFR BLD AUTO: 0 %
LYMPHOCYTES # BLD AUTO: 1.1 X10E3/UL (ref 0.7–3.1)
LYMPHOCYTES NFR BLD AUTO: 21 %
MCH RBC QN AUTO: 24 PG (ref 26.6–33)
MCHC RBC AUTO-ENTMCNC: 30.4 G/DL (ref 31.5–35.7)
MCV RBC AUTO: 79 FL (ref 79–97)
MONOCYTES # BLD AUTO: 0.5 X10E3/UL (ref 0.1–0.9)
MONOCYTES NFR BLD AUTO: 9 %
NEUTROPHILS # BLD AUTO: 3.7 X10E3/UL (ref 1.4–7)
NEUTROPHILS NFR BLD AUTO: 69 %
PLATELET # BLD AUTO: 280 X10E3/UL (ref 150–450)
RBC # BLD AUTO: 4.33 X10E6/UL (ref 3.77–5.28)
TSH SERPL DL<=0.005 MIU/L-ACNC: 5.27 UIU/ML (ref 0.45–4.5)
WBC # BLD AUTO: 5.3 X10E3/UL (ref 3.4–10.8)

## 2022-06-16 NOTE — PROGRESS NOTES
Please notify patient her thyroid level is again out of target range and she is still anemic. Please encourage her to follow up with Joaquin Suárez and to call us if her bleeding continues into next week

## 2023-03-03 ENCOUNTER — OFFICE VISIT (OUTPATIENT)
Dept: OBSTETRICS AND GYNECOLOGY | Age: 23
End: 2023-03-03
Payer: COMMERCIAL

## 2023-03-03 VITALS
BODY MASS INDEX: 34.55 KG/M2 | DIASTOLIC BLOOD PRESSURE: 68 MMHG | HEIGHT: 63 IN | SYSTOLIC BLOOD PRESSURE: 116 MMHG | WEIGHT: 195 LBS

## 2023-03-03 DIAGNOSIS — Z30.015 ENCOUNTER FOR INITIAL PRESCRIPTION OF VAGINAL RING HORMONAL CONTRACEPTIVE: ICD-10-CM

## 2023-03-03 DIAGNOSIS — Z01.419 WELL WOMAN EXAM WITH ROUTINE GYNECOLOGICAL EXAM: Primary | ICD-10-CM

## 2023-03-03 PROCEDURE — 2014F MENTAL STATUS ASSESS: CPT | Performed by: NURSE PRACTITIONER

## 2023-03-03 PROCEDURE — 3008F BODY MASS INDEX DOCD: CPT | Performed by: NURSE PRACTITIONER

## 2023-03-03 PROCEDURE — 99395 PREV VISIT EST AGE 18-39: CPT | Performed by: NURSE PRACTITIONER

## 2023-03-03 RX ORDER — ETONOGESTREL AND ETHINYL ESTRADIOL 11.7; 2.7 MG/1; MG/1
1 INSERT, EXTENDED RELEASE VAGINAL
Qty: 3 EACH | Refills: 3 | Status: SHIPPED | OUTPATIENT
Start: 2023-03-03 | End: 2024-03-02

## 2023-03-03 NOTE — PROGRESS NOTES
[facility], [place], operated by [facility]  Routine Annual Visit    3/3/2023    Patient: Madie Givens          MR#:[id number]      History of Present Illness    22 y.o. female  who presents for annual exam.    She has no GYN complaints- nuvaring going well.    She is due to see Joaquin Suárez- encouraged her to have labs rechecked and she agrees- will call to schedule     No LMP recorded (lmp unknown).  Obstetric History:  OB History        1    Para   1    Term   1       0    AB   0    Living   1       SAB   0    IAB   0    Ectopic   0    Molar   0    Multiple   0    Live Births   1               Menstrual History:     No LMP recorded (lmp unknown).       Sexual History:       ________________________________________  Patient Active Problem List   Diagnosis   • Elevated TSH   • Anemia, unspecified       Past Medical History:   Diagnosis Date   • Asthma     childhood   • Hypothyroidism        Past Surgical History:   Procedure Laterality Date   •  SECTION N/A 2021    Procedure:  SECTION PRIMARY;  Surgeon: Skylar Singh MD;  Location: [facility] [facility];  Service: Obstetrics/Gynecology;  Laterality: N/A;       Social History     Tobacco Use   Smoking Status Never   • Passive exposure: Never   Smokeless Tobacco Never       Family History   Problem Relation Age of Onset   • No Known Problems Mother    • No Known Problems Father        Prior to Admission medications    Medication Sig Start Date End Date Taking? Authorizing Provider   albuterol sulfate  (90 Base) MCG/ACT inhaler Inhale 2 puffs Every 4 (Four) Hours As Needed.   Yes Provider, MD John   cefdinir (OMNICEF) 300 MG capsule Take 1 po bid 23  Yes Brown Gonsalez MD   ferrous sulfate 325 (65 FE) MG tablet Take 1 tablet by mouth 2 (Two) Times a Day. 4/15/22  Yes Joaquin Suárez APRN   neomycin-polymyxin-hydrocortisone (CORTISPORIN) 1 % solution otic solution 4 drops into both ears 4 times daily. 23  " Yes Brown Gonsalez MD   Prenatal Vit-Fe Fumarate-FA (prenatal vitamin 27-0.8) 27-0.8 MG tablet tablet Take 1 tablet by mouth Daily.   Yes Provider, MD John   etonogestrel-ethinyl estradiol (NuvaRing) 0.12-0.015 MG/24HR vaginal ring Insert 1 each into the vagina Every 28 (Twenty-Eight) Days. 2/23/22 2/25/23  Caitlin Mcdonald, FRANK     ________________________________________    The following portions of the patient's history were reviewed and updated as appropriate: allergies, current medications, past family history, past medical history, past social history, past surgical history and problem list.    Review of Systems   Constitutional: Negative.    HENT: Negative.    Eyes: Negative for visual disturbance.   Respiratory: Negative for cough, shortness of breath and wheezing.    Cardiovascular: Negative for chest pain, palpitations and leg swelling.   Gastrointestinal: Negative for abdominal distention, abdominal pain, blood in stool, constipation, diarrhea, nausea and vomiting.   Endocrine: Negative for cold intolerance and heat intolerance.   Genitourinary: Negative for difficulty urinating, dyspareunia, dysuria, frequency, genital sores, hematuria, menstrual problem, pelvic pain, urgency, vaginal bleeding, vaginal discharge and vaginal pain.   Musculoskeletal: Negative.    Skin: Negative.    Neurological: Negative for dizziness, weakness, light-headedness, numbness and headaches.   Hematological: Negative.    Psychiatric/Behavioral: Negative.    Breasts: negative for lumps skin changes, dimpling, swelling, nipple changes/discharge bilaterally         Objective   Physical Exam    /68   Ht 160 cm (63\")   Wt 88.5 kg (195 lb)   LMP  (LMP Unknown)   BMI 34.54 kg/m²    BP Readings from Last 3 Encounters:   03/03/23 116/68   02/25/23 110/74   02/20/23 108/69      Wt Readings from Last 3 Encounters:   03/03/23 88.5 kg (195 lb)   02/25/23 86.2 kg (190 lb)   02/20/23 86.2 kg (190 lb)        BMI: " "Estimated body mass index is 34.54 kg/m² as calculated from the following:    Height as of this encounter: 160 cm (63\").    Weight as of this encounter: 88.5 kg (195 lb).            General:   alert, appears stated age and cooperative   Heart: regular rate and rhythm, S1, S2 normal, no murmur, click, rub or gallop   Lungs: clear to auscultation bilaterally   Abdomen: soft, non-tender, without masses or organomegaly   Breast: inspection negative, no nipple discharge or bleeding, no masses or nodularity palpable   Vulva: External genitalia including bartholin's glands, Urethra, [place]'s gland and urethra meatus are normal, Perineum, rectum and anus appear normal  and Bladder appears normal without significant prolapse    Vagina: normal mucosa, normal discharge   Cervix: no cervical motion tenderness and no lesions   Uterus: normal size, mobile and non-tender   Adnexa: normal adnexa     Assessment:    Diagnoses and all orders for this visit:    1. Well woman exam with routine gynecological exam (Primary)  -     IGP,CtNgTv,rfx Aptima HPV ASCU    2. Encounter for initial prescription of vaginal ring hormonal contraceptive  -     etonogestrel-ethinyl estradiol (NuvaRing) 0.12-0.015 MG/24HR vaginal ring; Insert 1 each into the vagina Every 28 (Twenty-Eight) Days.  Dispense: 3 each; Refill: 3       Counseled on healthy lifestyle modifications, safe sex, condom use, and self breast exams.        Caitlin Mcdonald, APRN  3/3/2023 13:17 EST  "

## 2023-05-24 ENCOUNTER — OFFICE VISIT (OUTPATIENT)
Dept: FAMILY MEDICINE CLINIC | Facility: CLINIC | Age: 23
End: 2023-05-24
Payer: COMMERCIAL

## 2023-05-24 VITALS
WEIGHT: 199.6 LBS | SYSTOLIC BLOOD PRESSURE: 110 MMHG | HEART RATE: 77 BPM | OXYGEN SATURATION: 99 % | DIASTOLIC BLOOD PRESSURE: 58 MMHG | BODY MASS INDEX: 35.37 KG/M2 | TEMPERATURE: 98.2 F | HEIGHT: 63 IN

## 2023-05-24 DIAGNOSIS — R79.89 ABNORMAL THYROID STIMULATING HORMONE (TSH) LEVEL: ICD-10-CM

## 2023-05-24 DIAGNOSIS — E66.09 CLASS 2 OBESITY DUE TO EXCESS CALORIES WITHOUT SERIOUS COMORBIDITY WITH BODY MASS INDEX (BMI) OF 35.0 TO 35.9 IN ADULT: ICD-10-CM

## 2023-05-24 DIAGNOSIS — Z00.01 ENCOUNTER FOR GENERAL ADULT MEDICAL EXAMINATION WITH ABNORMAL FINDINGS: Primary | ICD-10-CM

## 2023-05-24 DIAGNOSIS — D64.9 ANEMIA, UNSPECIFIED TYPE: ICD-10-CM

## 2023-05-24 NOTE — PROGRESS NOTES
Subjective   Madie Givens is a 23 y.o. female who presents for annual female wellness exam.  Chief Complaint   Patient presents with   • Annual Exam     Not fasting        Patient presents today for complete physical.  She has no new complaints today.    Anemia in the past-had been on ferrous sulfate.    Also had abnormal thyroid stimulating hormone lab in the past.  She did not follow-up for recheck.  We will recheck today.    Menstrual History: regular  Pregnancy History: 1  Sexual History: 1 male partner  Contraception: nuvaring  Hormone Replacement Therapy: n/a  Diet: could make healthier choices.  Drinks mostly water  Exercise: recently started work outs at home 2-3 times weekly  Do you feel safe? Reports she does  Have you ever been abused? Denies  Dentist: twice yearly  Eye doctor: been a while.  Needs appt    Mammogram: n/a  Pap Smear: 3/3/2023  Bone Density: n/a  Colon Cancer Screening: n/a    Immunization History   Administered Date(s) Administered   • DTaP, Unspecified 1999, 1999, 02/25/2000, 11/06/2000, 05/05/2003   • Flu Vaccine Intradermal Quad 18-64YR 10/22/2009, 09/27/2011   • FluLaval/Fluzone >6mos 12/22/2020   • HPV Quadrivalent 07/12/2010, 10/25/2010, 04/05/2011   • Hep B / HiB 1999   • Hep B, Adolescent or Pediatric 1999, 1999   • HiB 07/24/2000   • Hib (PRP-OMP) 1999   • IPV 1999, 1999, 05/18/2000, 05/05/2003   • Influenza Seasonal Injectable 10/22/2009, 09/27/2011   • MCV4 Unspecified 07/12/2010   • MMR 05/18/2000, 05/05/2003   • Meningococcal MCV4P (Menactra) 07/12/2010   • PEDS-Pneumococcal Conjugate (PCV7) 07/24/2000, 11/06/2000   • Rho (D) Immune Globulin 03/23/2021, 06/23/2021   • Tdap 07/12/2010, 03/23/2021   • Varicella 07/24/2000, 10/25/2010       The following portions of the patient's history were reviewed and updated as appropriate: allergies, current medications, past family history, past medical history, past social history, past  surgical history and problem list.    Past Medical History:   Diagnosis Date   • Asthma     childhood   • Hypothyroidism        Past Surgical History:   Procedure Laterality Date   •  SECTION N/A 2021    Procedure:  SECTION PRIMARY;  Surgeon: Skylar Singh MD;  Location: [facility] LABOR DELIVERY;  Service: Obstetrics/Gynecology;  Laterality: N/A;       Family History   Problem Relation Age of Onset   • No Known Problems Mother    • No Known Problems Father        Social History     Socioeconomic History   • Marital status: Single   Tobacco Use   • Smoking status: Never     Passive exposure: Never   • Smokeless tobacco: Never   Vaping Use   • Vaping Use: Never used   Substance and Sexual Activity   • Alcohol use: No   • Drug use: No   • Sexual activity: Yes     Partners: Male     Birth control/protection: Inserts     Comment: Nuvaring       Review of Systems   Constitutional: Negative for fatigue and fever.   Respiratory: Negative for cough, shortness of breath and wheezing.    Cardiovascular: Negative for chest pain.   Gastrointestinal: Negative for abdominal pain, constipation, diarrhea, nausea and vomiting.   Genitourinary: Negative for dysuria and urgency.   Skin: Negative.    Neurological: Negative for dizziness and headache.   Psychiatric/Behavioral: Negative for suicidal ideas and depressed mood. The patient is not nervous/anxious.        Objective   Vitals:    23 1409   BP: 110/58   Pulse: 77   Temp: 98.2 °F (36.8 °C)   SpO2: 99%     Body mass index is 35.37 kg/m².  Physical Exam  Constitutional:       Appearance: Normal appearance.   Cardiovascular:      Rate and Rhythm: Normal rate and regular rhythm.      Pulses: Normal pulses.   Pulmonary:      Effort: Pulmonary effort is normal.      Breath sounds: Normal breath sounds.   Skin:     General: Skin is warm and dry.   Neurological:      Mental Status: She is alert.   Psychiatric:         Mood and Affect: Mood normal.          Behavior: Behavior normal.         Thought Content: Thought content normal.         Judgment: Judgment normal.           Assessment & Plan   Diagnoses and all orders for this visit:    1. Encounter for general adult medical examination with abnormal findings (Primary)    2. Abnormal thyroid stimulating hormone (TSH) level  -     Comprehensive Metabolic Panel  -     TSH    3. Anemia, unspecified type  -     CBC & Differential  -     Iron Profile    4. Class 2 obesity due to excess calories without serious comorbidity with body mass index (BMI) of 35.0 to 35.9 in adult      Physical complete for patient.  We discussed the need for weight loss to healthy BMI between 18 and 25.  This can be achieved with reduction of calories and carbs in diet and inclusion of exercise.  She verbalized understanding and is agreeable.    Patient has had previous abnormal thyroid-stimulating hormone and anemia.  We will check labs today and call with results any changes needed plan of care.    Follow-up yearly for physical and sooner if needed based on lab results or any new issues.  She verbalized understanding and is agreeable    She is going to go to local pharmacy to obtain COVID-vaccine.         Discussed the importance of maintaining a healthy weight and getting regular exercise.  Educated patient on the benefits of healthy diet.  Advise follow-up annually for wellness exams.

## 2023-05-25 LAB
ALBUMIN SERPL-MCNC: 4.1 G/DL (ref 3.9–5)
ALBUMIN/GLOB SERPL: 1.5 {RATIO} (ref 1.2–2.2)
ALP SERPL-CCNC: 106 IU/L (ref 44–121)
ALT SERPL-CCNC: 19 IU/L (ref 0–32)
AST SERPL-CCNC: 21 IU/L (ref 0–40)
BASOPHILS # BLD AUTO: 0 X10E3/UL (ref 0–0.2)
BASOPHILS NFR BLD AUTO: 1 %
BILIRUB SERPL-MCNC: 0.6 MG/DL (ref 0–1.2)
BUN SERPL-MCNC: 11 MG/DL (ref 6–20)
BUN/CREAT SERPL: 12 (ref 9–23)
CALCIUM SERPL-MCNC: 9 MG/DL (ref 8.7–10.2)
CHLORIDE SERPL-SCNC: 103 MMOL/L (ref 96–106)
CO2 SERPL-SCNC: 24 MMOL/L (ref 20–29)
CREAT SERPL-MCNC: 0.93 MG/DL (ref 0.57–1)
EGFRCR SERPLBLD CKD-EPI 2021: 88 ML/MIN/1.73
EOSINOPHIL # BLD AUTO: 0.1 X10E3/UL (ref 0–0.4)
EOSINOPHIL NFR BLD AUTO: 1 %
ERYTHROCYTE [DISTWIDTH] IN BLOOD BY AUTOMATED COUNT: 14.9 % (ref 11.7–15.4)
GLOBULIN SER CALC-MCNC: 2.7 G/DL (ref 1.5–4.5)
GLUCOSE SERPL-MCNC: 90 MG/DL (ref 70–99)
HCT VFR BLD AUTO: 34.6 % (ref 34–46.6)
HGB BLD-MCNC: 10.5 G/DL (ref 11.1–15.9)
IMM GRANULOCYTES # BLD AUTO: 0 X10E3/UL (ref 0–0.1)
IMM GRANULOCYTES NFR BLD AUTO: 0 %
IRON SATN MFR SERPL: 6 % (ref 15–55)
IRON SERPL-MCNC: 28 UG/DL (ref 27–159)
LYMPHOCYTES # BLD AUTO: 1 X10E3/UL (ref 0.7–3.1)
LYMPHOCYTES NFR BLD AUTO: 20 %
MCH RBC QN AUTO: 24 PG (ref 26.6–33)
MCHC RBC AUTO-ENTMCNC: 30.3 G/DL (ref 31.5–35.7)
MCV RBC AUTO: 79 FL (ref 79–97)
MONOCYTES # BLD AUTO: 0.5 X10E3/UL (ref 0.1–0.9)
MONOCYTES NFR BLD AUTO: 9 %
NEUTROPHILS # BLD AUTO: 3.4 X10E3/UL (ref 1.4–7)
NEUTROPHILS NFR BLD AUTO: 69 %
PLATELET # BLD AUTO: 277 X10E3/UL (ref 150–450)
POTASSIUM SERPL-SCNC: 4.1 MMOL/L (ref 3.5–5.2)
PROT SERPL-MCNC: 6.8 G/DL (ref 6–8.5)
RBC # BLD AUTO: 4.37 X10E6/UL (ref 3.77–5.28)
SODIUM SERPL-SCNC: 139 MMOL/L (ref 134–144)
TIBC SERPL-MCNC: 437 UG/DL (ref 250–450)
TSH SERPL DL<=0.005 MIU/L-ACNC: 1.97 UIU/ML (ref 0.45–4.5)
UIBC SERPL-MCNC: 409 UG/DL (ref 131–425)
WBC # BLD AUTO: 4.9 X10E3/UL (ref 3.4–10.8)

## 2023-05-25 RX ORDER — FERROUS SULFATE 325(65) MG
325 TABLET ORAL 2 TIMES DAILY
Qty: 60 TABLET | Refills: 2 | Status: SHIPPED | OUTPATIENT
Start: 2023-05-25

## 2023-05-25 NOTE — PROGRESS NOTES
Please let patient know that anemia is stable but she is still slightly anemic.  Thyroid level is within normal range.  I would like her to restart iron supplement and make follow-up with me in 3 months.

## 2023-07-31 ENCOUNTER — HOSPITAL ENCOUNTER (EMERGENCY)
Facility: HOSPITAL | Age: 23
Discharge: HOME | End: 2023-07-31
Attending: EMERGENCY MEDICINE | Admitting: EMERGENCY MEDICINE
Payer: COMMERCIAL

## 2023-07-31 VITALS
BODY MASS INDEX: 32.44 KG/M2 | HEART RATE: 90 BPM | WEIGHT: 190 LBS | TEMPERATURE: 99.1 F | DIASTOLIC BLOOD PRESSURE: 68 MMHG | RESPIRATION RATE: 18 BRPM | SYSTOLIC BLOOD PRESSURE: 123 MMHG | HEIGHT: 64 IN | OXYGEN SATURATION: 100 %

## 2023-07-31 DIAGNOSIS — D64.9 ANEMIA, UNSPECIFIED TYPE: Primary | ICD-10-CM

## 2023-07-31 DIAGNOSIS — R42 DIZZINESS: ICD-10-CM

## 2023-07-31 LAB
ALBUMIN SERPL-MCNC: 4.2 G/DL (ref 3.5–5.2)
ALBUMIN/GLOB SERPL: 1.3 G/DL
ALP SERPL-CCNC: 85 U/L (ref 39–117)
ALT SERPL W P-5'-P-CCNC: 7 U/L (ref 1–33)
ANION GAP SERPL CALCULATED.3IONS-SCNC: 10.7 MMOL/L (ref 5–15)
AST SERPL-CCNC: 13 U/L (ref 1–32)
BACTERIA UR QL AUTO: ABNORMAL /HPF
BASOPHILS # BLD AUTO: 0.01 10*3/MM3 (ref 0–0.2)
BASOPHILS NFR BLD AUTO: 0.2 % (ref 0–1.5)
BILIRUB SERPL-MCNC: 0.8 MG/DL (ref 0–1.2)
BILIRUB UR QL STRIP: NEGATIVE
BUN SERPL-MCNC: 7 MG/DL (ref 6–20)
BUN/CREAT SERPL: 8.9 (ref 7–25)
CALCIUM SPEC-SCNC: 9 MG/DL (ref 8.6–10.5)
CHLORIDE SERPL-SCNC: 99 MMOL/L (ref 98–107)
CLARITY UR: CLEAR
CO2 SERPL-SCNC: 22.3 MMOL/L (ref 22–29)
COLOR UR: YELLOW
CREAT SERPL-MCNC: 0.79 MG/DL (ref 0.57–1)
DEPRECATED RDW RBC AUTO: 43.1 FL (ref 37–54)
EGFRCR SERPLBLD CKD-EPI 2021: 107.3 ML/MIN/1.73
EOSINOPHIL # BLD AUTO: 0.01 10*3/MM3 (ref 0–0.4)
EOSINOPHIL NFR BLD AUTO: 0.2 % (ref 0.3–6.2)
ERYTHROCYTE [DISTWIDTH] IN BLOOD BY AUTOMATED COUNT: 15.6 % (ref 12.3–15.4)
FLUAV RNA RESP QL NAA+PROBE: NOT DETECTED
FLUBV RNA RESP QL NAA+PROBE: NOT DETECTED
GLOBULIN UR ELPH-MCNC: 3.2 GM/DL
GLUCOSE SERPL-MCNC: 101 MG/DL (ref 65–99)
GLUCOSE UR STRIP-MCNC: NEGATIVE MG/DL
HCG INTACT+B SERPL-ACNC: NORMAL MIU/ML
HCT VFR BLD AUTO: 33.6 % (ref 34–46.6)
HGB BLD-MCNC: 10.2 G/DL (ref 12–15.9)
HGB UR QL STRIP.AUTO: ABNORMAL
HYALINE CASTS UR QL AUTO: ABNORMAL /LPF
IMM GRANULOCYTES # BLD AUTO: 0.02 10*3/MM3 (ref 0–0.05)
IMM GRANULOCYTES NFR BLD AUTO: 0.3 % (ref 0–0.5)
KETONES UR QL STRIP: ABNORMAL
LEUKOCYTE ESTERASE UR QL STRIP.AUTO: NEGATIVE
LYMPHOCYTES # BLD AUTO: 0.95 10*3/MM3 (ref 0.7–3.1)
LYMPHOCYTES NFR BLD AUTO: 15.2 % (ref 19.6–45.3)
MAGNESIUM SERPL-MCNC: 1.9 MG/DL (ref 1.6–2.6)
MCH RBC QN AUTO: 23.8 PG (ref 26.6–33)
MCHC RBC AUTO-ENTMCNC: 30.4 G/DL (ref 31.5–35.7)
MCV RBC AUTO: 78.3 FL (ref 79–97)
MONOCYTES # BLD AUTO: 0.37 10*3/MM3 (ref 0.1–0.9)
MONOCYTES NFR BLD AUTO: 5.9 % (ref 5–12)
NEUTROPHILS NFR BLD AUTO: 4.91 10*3/MM3 (ref 1.7–7)
NEUTROPHILS NFR BLD AUTO: 78.2 % (ref 42.7–76)
NITRITE UR QL STRIP: NEGATIVE
NRBC BLD AUTO-RTO: 0 /100 WBC (ref 0–0.2)
PH UR STRIP.AUTO: 6 [PH] (ref 4.5–8)
PLATELET # BLD AUTO: 250 10*3/MM3 (ref 140–450)
PMV BLD AUTO: 10 FL (ref 6–12)
POTASSIUM SERPL-SCNC: 3.6 MMOL/L (ref 3.5–5.2)
PROT SERPL-MCNC: 7.4 G/DL (ref 6–8.5)
PROT UR QL STRIP: NEGATIVE
RBC # BLD AUTO: 4.29 10*6/MM3 (ref 3.77–5.28)
RBC # UR STRIP: ABNORMAL /HPF
REF LAB TEST METHOD: ABNORMAL
SARS-COV-2 RNA RESP QL NAA+PROBE: NOT DETECTED
SODIUM SERPL-SCNC: 132 MMOL/L (ref 136–145)
SP GR UR STRIP: 1.02 (ref 1–1.03)
SQUAMOUS #/AREA URNS HPF: ABNORMAL /HPF
TROPONIN T SERPL HS-MCNC: <6 NG/L
UROBILINOGEN UR QL STRIP: ABNORMAL
WBC # UR STRIP: ABNORMAL /HPF
WBC NRBC COR # BLD: 6.27 10*3/MM3 (ref 3.4–10.8)

## 2023-07-31 PROCEDURE — 36415 COLL VENOUS BLD VENIPUNCTURE: CPT

## 2023-07-31 PROCEDURE — 84702 CHORIONIC GONADOTROPIN TEST: CPT

## 2023-07-31 PROCEDURE — 84484 ASSAY OF TROPONIN QUANT: CPT

## 2023-07-31 PROCEDURE — 80053 COMPREHEN METABOLIC PANEL: CPT

## 2023-07-31 PROCEDURE — 85025 COMPLETE CBC W/AUTO DIFF WBC: CPT

## 2023-07-31 PROCEDURE — 81001 URINALYSIS AUTO W/SCOPE: CPT

## 2023-07-31 PROCEDURE — 99283 EMERGENCY DEPT VISIT LOW MDM: CPT

## 2023-07-31 PROCEDURE — 87636 SARSCOV2 & INF A&B AMP PRB: CPT

## 2023-07-31 PROCEDURE — 83735 ASSAY OF MAGNESIUM: CPT

## 2023-07-31 PROCEDURE — 93005 ELECTROCARDIOGRAM TRACING: CPT

## 2023-07-31 RX ORDER — SODIUM CHLORIDE 0.9 % (FLUSH) 0.9 %
10 SYRINGE (ML) INJECTION AS NEEDED
Status: DISCONTINUED | OUTPATIENT
Start: 2023-07-31 | End: 2023-07-31 | Stop reason: HOSPADM

## 2023-07-31 RX ADMIN — SODIUM CHLORIDE 1000 ML: 9 INJECTION, SOLUTION INTRAVENOUS at 13:46

## 2023-08-01 LAB — QT INTERVAL: 388 MS

## 2023-08-08 ENCOUNTER — OFFICE VISIT (OUTPATIENT)
Dept: OBSTETRICS AND GYNECOLOGY | Age: 23
End: 2023-08-08
Payer: COMMERCIAL

## 2023-08-08 VITALS
DIASTOLIC BLOOD PRESSURE: 80 MMHG | BODY MASS INDEX: 32.6 KG/M2 | SYSTOLIC BLOOD PRESSURE: 112 MMHG | WEIGHT: 184 LBS | HEIGHT: 63 IN

## 2023-08-08 DIAGNOSIS — Z31.5 ENCOUNTER FOR PROCREATIVE GENETIC COUNSELING: ICD-10-CM

## 2023-08-08 DIAGNOSIS — Z13.89 SCREENING FOR BLOOD OR PROTEIN IN URINE: ICD-10-CM

## 2023-08-08 DIAGNOSIS — Z34.01 ENCOUNTER FOR SUPERVISION OF NORMAL FIRST PREGNANCY IN FIRST TRIMESTER: ICD-10-CM

## 2023-08-08 DIAGNOSIS — Z32.00 VISIT FOR CONFIRMATION OF PREGNANCY TEST RESULT WITH PHYSICAL EXAM: Primary | ICD-10-CM

## 2023-08-08 PROBLEM — O26.899 RH NEGATIVE STATUS DURING PREGNANCY: Status: ACTIVE | Noted: 2023-08-08

## 2023-08-08 PROBLEM — Z67.91 RH NEGATIVE STATUS DURING PREGNANCY: Status: ACTIVE | Noted: 2023-08-08

## 2023-08-08 PROBLEM — D64.9 ANEMIA, UNSPECIFIED: Status: RESOLVED | Noted: 2022-03-10 | Resolved: 2023-08-08

## 2023-08-08 PROBLEM — R79.89 ELEVATED TSH: Status: RESOLVED | Noted: 2022-03-07 | Resolved: 2023-08-08

## 2023-08-08 PROBLEM — Z98.891 PREVIOUS CESAREAN SECTION: Status: ACTIVE | Noted: 2023-08-08

## 2023-08-08 LAB
BILIRUB BLD-MCNC: NEGATIVE MG/DL
GLUCOSE UR STRIP-MCNC: NEGATIVE MG/DL
KETONES UR QL: NEGATIVE
LEUKOCYTE EST, POC: ABNORMAL
NITRITE UR-MCNC: NEGATIVE MG/ML
PH UR: 7 [PH] (ref 5–8)
PROT UR STRIP-MCNC: NEGATIVE MG/DL
RBC # UR STRIP: ABNORMAL /UL
SP GR UR: 1.01 (ref 1–1.03)
UROBILINOGEN UR QL: NORMAL

## 2023-08-08 NOTE — PROGRESS NOTES
"Subjective   Madie Givens is a 23 y.o. female Cc:  pregnancy confirmation with [facility] - LMP 4/24/23 , pap 3/3 /23 neg , henna today panorama only , horizon neg 2020 , prenatal vitamins makes patient sick - nausea tried gummies samples of other prenatal vitamins given to patient today , OB folder and a baby bag , denies any vag spotting or abdominal pain today       History of Present Illness    The following portions of the patient's history were reviewed and updated as appropriate: allergies, current medications, past family history, past medical history, past social history, past surgical history, and problem list.    Review of Systems   Constitutional:  Negative for chills, fatigue and fever.   Gastrointestinal:  Negative for abdominal pain.   Genitourinary:  Negative for dysuria, menstrual problem, pelvic pain, vaginal bleeding, vaginal discharge and vaginal pain.   All other systems reviewed and are negative.  /80   Ht 160 cm (63\")   Wt 83.5 kg (184 lb)   LMP 04/27/2023 (Exact Date)   BMI 32.59 kg/mý     Objective   Physical Exam  Vitals and nursing note reviewed.   Constitutional:       Appearance: Normal appearance.   Pulmonary:      Effort: Pulmonary effort is normal.   Neurological:      Mental Status: She is alert and oriented to person, place, and time.   Psychiatric:         Mood and Affect: Mood normal.         Behavior: Behavior normal.         Assessment & Plan   Diagnoses and all orders for this visit:    1. Visit for confirmation of pregnancy test result with physical exam (Primary)  -     ABO / Rh  -     Antibody Screen  -     CBC & Differential  -     Varicella Zoster Antibody, IgG  -     Urine Culture - Urine, Urine, Clean Catch  -     Rubella Antibody, IgG  -     RPR  -     HIV-1 / O / 2 Ag / Antibody 4th Generation  -     Hepatitis C Antibody  -     Hepatitis B Surface Antigen  -     Hemoglobin A1c  -     TSH  -     Chlamydia trachomatis, Neisseria gonorrhoeae, Trichomonas " vaginalis, PCR - Urine, Urine, Clean Catch    2. Screening for blood or protein in urine  -     POC Urinalysis Dipstick      Counseling was given to patient for the following topics: instructions for management, risks and benefits of treatment options, and importance of treatment compliance . Total time of the encounter was 30 minutes and 25 minutes was spent counseling.    Return in about 4 weeks (around 9/5/2023), or ob intake.

## 2023-08-10 DIAGNOSIS — O99.019 MATERNAL ANEMIA IN PREGNANCY, ANTEPARTUM: ICD-10-CM

## 2023-08-10 DIAGNOSIS — Z28.39 MATERNAL VARICELLA, NON-IMMUNE: Primary | ICD-10-CM

## 2023-08-10 DIAGNOSIS — O09.899 MATERNAL VARICELLA, NON-IMMUNE: Primary | ICD-10-CM

## 2023-08-10 LAB
ABO GROUP BLD: NORMAL
BACTERIA UR CULT: NO GROWTH
BACTERIA UR CULT: NORMAL
BASOPHILS # BLD AUTO: 0 X10E3/UL (ref 0–0.2)
BASOPHILS NFR BLD AUTO: 0 %
BLD GP AB SCN SERPL QL: NEGATIVE
C TRACH RRNA SPEC QL NAA+PROBE: NEGATIVE
EOSINOPHIL # BLD AUTO: 0 X10E3/UL (ref 0–0.4)
EOSINOPHIL NFR BLD AUTO: 0 %
ERYTHROCYTE [DISTWIDTH] IN BLOOD BY AUTOMATED COUNT: 15.9 % (ref 11.7–15.4)
HBA1C MFR BLD: 5.2 % (ref 4.8–5.6)
HBV SURFACE AG SERPL QL IA: NEGATIVE
HCT VFR BLD AUTO: 30 % (ref 34–46.6)
HCV IGG SERPL QL IA: NON REACTIVE
HGB BLD-MCNC: 9.5 G/DL (ref 11.1–15.9)
HIV 1+2 AB+HIV1 P24 AG SERPL QL IA: NON REACTIVE
IMM GRANULOCYTES # BLD AUTO: 0 X10E3/UL (ref 0–0.1)
IMM GRANULOCYTES NFR BLD AUTO: 0 %
LYMPHOCYTES # BLD AUTO: 0.9 X10E3/UL (ref 0.7–3.1)
LYMPHOCYTES NFR BLD AUTO: 17 %
MCH RBC QN AUTO: 24.2 PG (ref 26.6–33)
MCHC RBC AUTO-ENTMCNC: 31.7 G/DL (ref 31.5–35.7)
MCV RBC AUTO: 77 FL (ref 79–97)
MONOCYTES # BLD AUTO: 0.3 X10E3/UL (ref 0.1–0.9)
MONOCYTES NFR BLD AUTO: 7 %
N GONORRHOEA RRNA SPEC QL NAA+PROBE: NEGATIVE
NEUTROPHILS # BLD AUTO: 3.9 X10E3/UL (ref 1.4–7)
NEUTROPHILS NFR BLD AUTO: 76 %
PLATELET # BLD AUTO: 250 X10E3/UL (ref 150–450)
RBC # BLD AUTO: 3.92 X10E6/UL (ref 3.77–5.28)
RH BLD: NEGATIVE
RPR SER QL: NON REACTIVE
RUBV IGG SERPL IA-ACNC: <0.9 INDEX
T VAGINALIS RRNA SPEC QL NAA+PROBE: NEGATIVE
TSH SERPL DL<=0.005 MIU/L-ACNC: 2.69 UIU/ML (ref 0.45–4.5)
VZV IGG SER IA-ACNC: <135 INDEX
WBC # BLD AUTO: 5.1 X10E3/UL (ref 3.4–10.8)

## 2023-08-10 RX ORDER — FERROUS SULFATE 325(65) MG
325 TABLET ORAL
Qty: 90 TABLET | Refills: 3 | Status: SHIPPED | OUTPATIENT
Start: 2023-08-10

## 2023-09-05 ENCOUNTER — ROUTINE PRENATAL (OUTPATIENT)
Dept: OBSTETRICS AND GYNECOLOGY | Age: 23
End: 2023-09-05
Payer: COMMERCIAL

## 2023-09-05 VITALS — BODY MASS INDEX: 32.06 KG/M2 | SYSTOLIC BLOOD PRESSURE: 110 MMHG | DIASTOLIC BLOOD PRESSURE: 64 MMHG | WEIGHT: 181 LBS

## 2023-09-05 DIAGNOSIS — O09.899 RUBELLA NON-IMMUNE STATUS, ANTEPARTUM: ICD-10-CM

## 2023-09-05 DIAGNOSIS — Z67.91 RH NEGATIVE STATUS DURING PREGNANCY IN SECOND TRIMESTER: ICD-10-CM

## 2023-09-05 DIAGNOSIS — Z28.39 MATERNAL VARICELLA, NON-IMMUNE: ICD-10-CM

## 2023-09-05 DIAGNOSIS — O26.892 RH NEGATIVE STATUS DURING PREGNANCY IN SECOND TRIMESTER: ICD-10-CM

## 2023-09-05 DIAGNOSIS — Z34.82 PRENATAL CARE, SUBSEQUENT PREGNANCY IN SECOND TRIMESTER: Primary | ICD-10-CM

## 2023-09-05 DIAGNOSIS — Z28.39 RUBELLA NON-IMMUNE STATUS, ANTEPARTUM: ICD-10-CM

## 2023-09-05 DIAGNOSIS — Z98.891 PREVIOUS CESAREAN SECTION: ICD-10-CM

## 2023-09-05 DIAGNOSIS — O99.019 MATERNAL ANEMIA IN PREGNANCY, ANTEPARTUM: ICD-10-CM

## 2023-09-05 DIAGNOSIS — O09.899 MATERNAL VARICELLA, NON-IMMUNE: ICD-10-CM

## 2023-09-05 DIAGNOSIS — Z13.89 SCREENING FOR BLOOD OR PROTEIN IN URINE: ICD-10-CM

## 2023-09-05 PROBLEM — Z34.90 PREGNANCY: Status: ACTIVE | Noted: 2023-09-05

## 2023-09-05 LAB
BILIRUB BLD-MCNC: ABNORMAL MG/DL
GLUCOSE UR STRIP-MCNC: NEGATIVE MG/DL
KETONES UR QL: ABNORMAL
LEUKOCYTE EST, POC: ABNORMAL
NITRITE UR-MCNC: NEGATIVE MG/ML
PH UR: 6 [PH] (ref 5–8)
PROT UR STRIP-MCNC: ABNORMAL MG/DL
RBC # UR STRIP: ABNORMAL /UL
SP GR UR: 1.01 (ref 1–1.03)
UROBILINOGEN UR QL: ABNORMAL

## 2023-09-05 NOTE — PROGRESS NOTES
Chief Complaint   Patient presents with    Routine Prenatal Visit     Cc: ob intake today , prenatal sample given to pt today , no ob complaints today , patient knows the gender - boy        HPI: 23 y.o.  at 17w1d presents for prenatal care    Vitals:    23 1030   BP: 110/64   Weight: 82.1 kg (181 lb)     Total weight gain for pregnancy:  -1.361 kg (-3 lb)    Review of systems:   Gen: negative  CV:     negative  GI: negative  :   negative  MS:    negative  Neuro: negative  Pul: negative    A/P  1. Intrauterine pregnancy at 17w1d   2. Pregnancy Risk:  HIGH RISK        Diagnoses and all orders for this visit:    1. Screening for blood or protein in urine (Primary)  -     POC Urinalysis Dipstick    2. Rubella non-immune status, antepartum    3. Rh negative status during pregnancy in second trimester    4. Maternal anemia in pregnancy, antepartum    5. Maternal varicella, non-immune    6. Previous  section        Nutrition and weight gain were addressed.  -----------------------  PLAN:   Return in about 3 weeks (around 2023), or ob check and anatomy US.  Previous  section-desires repeat section  Rubella nonimmune-MMR postpartum  Rh--RhoGAM at 28 weeks    Skylar Singh MD  2023 17:14 EDT

## 2023-09-06 ENCOUNTER — TELEPHONE (OUTPATIENT)
Dept: OBSTETRICS AND GYNECOLOGY | Age: 23
End: 2023-09-06
Payer: COMMERCIAL

## 2023-09-06 NOTE — TELEPHONE ENCOUNTER
Pt is 17w2d & was seen in office yesterday for ob f/u. Pt calling stating she viewed UA results on "[employer],"hart & is concerned due to multiple results being abnormal. Pt would like to make sure that everything is okay & asking if there is anything she can do differently to improve results? Please advise.

## 2023-10-02 ENCOUNTER — ROUTINE PRENATAL (OUTPATIENT)
Dept: OBSTETRICS AND GYNECOLOGY | Age: 23
End: 2023-10-02
Payer: COMMERCIAL

## 2023-10-02 VITALS — SYSTOLIC BLOOD PRESSURE: 120 MMHG | DIASTOLIC BLOOD PRESSURE: 68 MMHG | WEIGHT: 184.6 LBS | BODY MASS INDEX: 32.7 KG/M2

## 2023-10-02 DIAGNOSIS — Z28.39 RUBELLA NON-IMMUNE STATUS, ANTEPARTUM: ICD-10-CM

## 2023-10-02 DIAGNOSIS — Z3A.21 21 WEEKS GESTATION OF PREGNANCY: ICD-10-CM

## 2023-10-02 DIAGNOSIS — Z98.891 PREVIOUS CESAREAN SECTION: ICD-10-CM

## 2023-10-02 DIAGNOSIS — O26.892 RH NEGATIVE STATUS DURING PREGNANCY IN SECOND TRIMESTER: ICD-10-CM

## 2023-10-02 DIAGNOSIS — Z28.39 MATERNAL VARICELLA, NON-IMMUNE: ICD-10-CM

## 2023-10-02 DIAGNOSIS — O99.019 MATERNAL ANEMIA IN PREGNANCY, ANTEPARTUM: ICD-10-CM

## 2023-10-02 DIAGNOSIS — O09.899 MATERNAL VARICELLA, NON-IMMUNE: ICD-10-CM

## 2023-10-02 DIAGNOSIS — O09.899 RUBELLA NON-IMMUNE STATUS, ANTEPARTUM: ICD-10-CM

## 2023-10-02 DIAGNOSIS — Z13.89 SCREENING FOR BLOOD OR PROTEIN IN URINE: Primary | ICD-10-CM

## 2023-10-02 DIAGNOSIS — Z67.91 RH NEGATIVE STATUS DURING PREGNANCY IN SECOND TRIMESTER: ICD-10-CM

## 2023-10-02 LAB
BILIRUB BLD-MCNC: NEGATIVE MG/DL
GLUCOSE UR STRIP-MCNC: NEGATIVE MG/DL
KETONES UR QL: NEGATIVE
LEUKOCYTE EST, POC: NEGATIVE
NITRITE UR-MCNC: NEGATIVE MG/ML
PH UR: 7.5 [PH] (ref 5–8)
PROT UR STRIP-MCNC: ABNORMAL MG/DL
RBC # UR STRIP: NEGATIVE /UL
SP GR UR: 1.02 (ref 1–1.03)
UROBILINOGEN UR QL: ABNORMAL

## 2023-10-02 NOTE — PROGRESS NOTES
Chief Complaint   Patient presents with    Routine Prenatal Visit     CC : pt stated no issues       HPI: 23 y.o.  at 21w0d here for anatomy u/s and PNV  No problems    Vitals:    10/02/23 1426   BP: 120/68   Weight: 83.7 kg (184 lb 9.6 oz)     Total weight gain for pregnancy:  0.272 kg (9.6 oz)    Review of systems:   Gen: negative  CV:     negative  GI: negative  :   negative and good fetal movement noted   MS:    negative  Neuro: negative and denies headaches and visual changes   Pul: negative    A/P  1. Intrauterine pregnancy at 21w0d   2. Pregnancy Risk:  HIGH RISK        Diagnoses and all orders for this visit:    1. Screening for blood or protein in urine (Primary)  -     POC Urinalysis Dipstick    2. Previous  section    3. Rh negative status during pregnancy in second trimester    4. Maternal varicella, non-immune    5. Rubella non-immune status, antepartum    6. 21 weeks gestation of pregnancy    7. Maternal anemia in pregnancy, antepartum      Anatomy scan complete and normal   labor was discussed.  Warnings were provided.  -----------------------  PLAN:   4 weeks  Previous  section-desires repeat section  Rubella nonimmune-MMR postpartum  Rh--RhoGAM at 28 weeks  Continue Iron daily for anemia    Citlali Lundy, APRN  10/2/2023 14:49 EDT

## 2023-10-31 ENCOUNTER — ROUTINE PRENATAL (OUTPATIENT)
Dept: OBSTETRICS AND GYNECOLOGY | Age: 23
End: 2023-10-31
Payer: COMMERCIAL

## 2023-10-31 VITALS — SYSTOLIC BLOOD PRESSURE: 110 MMHG | BODY MASS INDEX: 33.13 KG/M2 | DIASTOLIC BLOOD PRESSURE: 62 MMHG | WEIGHT: 187 LBS

## 2023-10-31 DIAGNOSIS — Z34.82 PRENATAL CARE, SUBSEQUENT PREGNANCY IN SECOND TRIMESTER: Primary | ICD-10-CM

## 2023-10-31 DIAGNOSIS — Z67.91 RH NEGATIVE STATUS DURING PREGNANCY IN SECOND TRIMESTER: ICD-10-CM

## 2023-10-31 DIAGNOSIS — O09.899 RUBELLA NON-IMMUNE STATUS, ANTEPARTUM: ICD-10-CM

## 2023-10-31 DIAGNOSIS — O98.512 COVID-19 AFFECTING PREGNANCY IN SECOND TRIMESTER: ICD-10-CM

## 2023-10-31 DIAGNOSIS — Z98.891 PREVIOUS CESAREAN SECTION: ICD-10-CM

## 2023-10-31 DIAGNOSIS — Z13.89 SCREENING FOR BLOOD OR PROTEIN IN URINE: ICD-10-CM

## 2023-10-31 DIAGNOSIS — O26.892 RH NEGATIVE STATUS DURING PREGNANCY IN SECOND TRIMESTER: ICD-10-CM

## 2023-10-31 DIAGNOSIS — Z28.39 RUBELLA NON-IMMUNE STATUS, ANTEPARTUM: ICD-10-CM

## 2023-10-31 DIAGNOSIS — O09.899 MATERNAL VARICELLA, NON-IMMUNE: ICD-10-CM

## 2023-10-31 DIAGNOSIS — O99.019 MATERNAL ANEMIA IN PREGNANCY, ANTEPARTUM: ICD-10-CM

## 2023-10-31 DIAGNOSIS — Z28.39 MATERNAL VARICELLA, NON-IMMUNE: ICD-10-CM

## 2023-10-31 DIAGNOSIS — U07.1 COVID-19 AFFECTING PREGNANCY IN SECOND TRIMESTER: ICD-10-CM

## 2023-10-31 LAB
BILIRUB BLD-MCNC: NEGATIVE MG/DL
GLUCOSE UR STRIP-MCNC: NEGATIVE MG/DL
KETONES UR QL: NEGATIVE
LEUKOCYTE EST, POC: ABNORMAL
NITRITE UR-MCNC: NEGATIVE MG/ML
PH UR: 7 [PH] (ref 5–8)
PROT UR STRIP-MCNC: NEGATIVE MG/DL
RBC # UR STRIP: NEGATIVE /UL
SP GR UR: 1.02 (ref 1–1.03)
UROBILINOGEN UR QL: NORMAL

## 2023-10-31 RX ORDER — ASPIRIN 81 MG/1
81 TABLET ORAL DAILY
Qty: 90 TABLET | Refills: 3 | Status: SHIPPED | OUTPATIENT
Start: 2023-10-31

## 2023-10-31 NOTE — PROGRESS NOTES
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  at 25w1d presents for prenatal care     Vitals:    10/31/23 0940   BP: 110/62   Weight: 84.8 kg (187 lb)     Total weight gain for pregnancy:  1.361 kg (3 lb)    Review of systems:   Gen: negative  CV:     lower extremity edema  GI: negative  :   negative and good fetal movement noted   MS:    negative  Neuro: negative  Pul: negative    A/P  1. Intrauterine pregnancy at 25w1d   2. Pregnancy Risk:  HIGH RISK        Diagnoses and all orders for this visit:    1. Prenatal care, subsequent pregnancy in second trimester (Primary)    2. Screening for blood or protein in urine  -     POC Urinalysis Dipstick    3. Maternal anemia in pregnancy, antepartum  -     CBC (No Diff)    4. Rubella non-immune status, antepartum    5. Rh negative status during pregnancy in second trimester    6. Previous  section    7. Maternal varicella, non-immune    8. COVID-19 affecting pregnancy in second trimester  -     aspirin 81 MG EC tablet; Take 1 tablet by mouth Daily.  Dispense: 90 tablet; Refill: 3        Nutrition and weight gain were addressed.  -----------------------  PLAN:   Return in about 3 weeks (around 2023), or ob check and growth US and one-hour gtt.  Covid -start ASA   Anemia-continue iron-check CBC today  Rh--RhoGAM next visit  Prior  section-desires repeat       Skylar Singh MD  10/31/2023 10:06 EDT

## 2023-11-01 DIAGNOSIS — D50.9 IRON DEFICIENCY ANEMIA DURING PREGNANCY: Primary | ICD-10-CM

## 2023-11-01 DIAGNOSIS — O99.019 IRON DEFICIENCY ANEMIA DURING PREGNANCY: Primary | ICD-10-CM

## 2023-11-01 LAB
ERYTHROCYTE [DISTWIDTH] IN BLOOD BY AUTOMATED COUNT: 15.5 % (ref 11.7–15.4)
HCT VFR BLD AUTO: 27 % (ref 34–46.6)
HGB BLD-MCNC: 8.5 G/DL (ref 11.1–15.9)
MCH RBC QN AUTO: 24.9 PG (ref 26.6–33)
MCHC RBC AUTO-ENTMCNC: 31.5 G/DL (ref 31.5–35.7)
MCV RBC AUTO: 79 FL (ref 79–97)
PLATELET # BLD AUTO: 261 X10E3/UL (ref 150–450)
RBC # BLD AUTO: 3.41 X10E6/UL (ref 3.77–5.28)
WBC # BLD AUTO: 6.1 X10E3/UL (ref 3.4–10.8)

## 2023-11-01 NOTE — PROGRESS NOTES
Call patient and notify of abnormal hemoglobin results -very anemic--we will send to hematology to schedule IV iron infusions

## 2023-11-02 ENCOUNTER — TELEPHONE (OUTPATIENT)
Dept: OBSTETRICS AND GYNECOLOGY | Age: 23
End: 2023-11-02
Payer: COMMERCIAL

## 2023-11-02 NOTE — TELEPHONE ENCOUNTER
I called and spoke to Won at [facility] and let him know that Dr. Singh was good with their office addressing the B12. He said that he would let her know. Pt will be there 11/3 for her first Iron infusion.

## 2023-11-02 NOTE — TELEPHONE ENCOUNTER
FRANK Thayer with [facility] called to let you know that Madie's B12 and Folate is low. Deanna is wanting to know if she should start her on B12 injections or if you wanted to address it. Records have been routed to you. Can you please advise?

## 2023-11-21 ENCOUNTER — PREP FOR SURGERY (OUTPATIENT)
Dept: OTHER | Facility: HOSPITAL | Age: 23
End: 2023-11-21
Payer: COMMERCIAL

## 2023-11-21 ENCOUNTER — ROUTINE PRENATAL (OUTPATIENT)
Dept: OBSTETRICS AND GYNECOLOGY | Age: 23
End: 2023-11-21
Payer: COMMERCIAL

## 2023-11-21 VITALS — BODY MASS INDEX: 33.3 KG/M2 | SYSTOLIC BLOOD PRESSURE: 118 MMHG | DIASTOLIC BLOOD PRESSURE: 68 MMHG | WEIGHT: 188 LBS

## 2023-11-21 DIAGNOSIS — Z67.91 RH NEGATIVE STATUS DURING PREGNANCY IN THIRD TRIMESTER: ICD-10-CM

## 2023-11-21 DIAGNOSIS — Z28.39 MATERNAL VARICELLA, NON-IMMUNE: ICD-10-CM

## 2023-11-21 DIAGNOSIS — Z13.89 SCREENING FOR BLOOD OR PROTEIN IN URINE: ICD-10-CM

## 2023-11-21 DIAGNOSIS — Z13.1 SCREENING FOR DIABETES MELLITUS: ICD-10-CM

## 2023-11-21 DIAGNOSIS — Z98.891 PREVIOUS CESAREAN SECTION: ICD-10-CM

## 2023-11-21 DIAGNOSIS — O26.893 RH NEGATIVE STATUS DURING PREGNANCY IN THIRD TRIMESTER: ICD-10-CM

## 2023-11-21 DIAGNOSIS — O99.019 MATERNAL ANEMIA IN PREGNANCY, ANTEPARTUM: ICD-10-CM

## 2023-11-21 DIAGNOSIS — Z28.39 RUBELLA NON-IMMUNE STATUS, ANTEPARTUM: ICD-10-CM

## 2023-11-21 DIAGNOSIS — Z23 ENCOUNTER FOR ADMINISTRATION OF VACCINE: ICD-10-CM

## 2023-11-21 DIAGNOSIS — Z98.891 PREVIOUS CESAREAN SECTION: Primary | ICD-10-CM

## 2023-11-21 DIAGNOSIS — O09.899 RUBELLA NON-IMMUNE STATUS, ANTEPARTUM: ICD-10-CM

## 2023-11-21 DIAGNOSIS — O09.899 MATERNAL VARICELLA, NON-IMMUNE: ICD-10-CM

## 2023-11-21 DIAGNOSIS — Z13.0 SCREENING FOR IRON DEFICIENCY ANEMIA: Primary | ICD-10-CM

## 2023-11-21 DIAGNOSIS — Z34.83 PRENATAL CARE, SUBSEQUENT PREGNANCY IN THIRD TRIMESTER: ICD-10-CM

## 2023-11-21 DIAGNOSIS — O98.512 COVID-19 AFFECTING PREGNANCY IN SECOND TRIMESTER: ICD-10-CM

## 2023-11-21 DIAGNOSIS — U07.1 COVID-19 AFFECTING PREGNANCY IN SECOND TRIMESTER: ICD-10-CM

## 2023-11-21 LAB
BILIRUB BLD-MCNC: NEGATIVE MG/DL
GLUCOSE UR STRIP-MCNC: NEGATIVE MG/DL
KETONES UR QL: NEGATIVE
LEUKOCYTE EST, POC: NEGATIVE
NITRITE UR-MCNC: NEGATIVE MG/ML
PH UR: 6 [PH] (ref 5–8)
PROT UR STRIP-MCNC: NEGATIVE MG/DL
RBC # UR STRIP: NEGATIVE /UL
SP GR UR: 1.01 (ref 1–1.03)
UROBILINOGEN UR QL: NORMAL

## 2023-11-21 RX ORDER — SODIUM CHLORIDE 0.9 % (FLUSH) 0.9 %
10 SYRINGE (ML) INJECTION EVERY 12 HOURS SCHEDULED
OUTPATIENT
Start: 2023-11-21

## 2023-11-21 RX ORDER — SODIUM CHLORIDE, SODIUM LACTATE, POTASSIUM CHLORIDE, CALCIUM CHLORIDE 600; 310; 30; 20 MG/100ML; MG/100ML; MG/100ML; MG/100ML
125 INJECTION, SOLUTION INTRAVENOUS CONTINUOUS
OUTPATIENT
Start: 2023-11-21

## 2023-11-21 RX ORDER — METHYLERGONOVINE MALEATE 0.2 MG/ML
200 INJECTION INTRAVENOUS ONCE AS NEEDED
OUTPATIENT
Start: 2023-11-21

## 2023-11-21 RX ORDER — ACETAMINOPHEN 500 MG
1000 TABLET ORAL ONCE
OUTPATIENT
Start: 2023-11-21 | End: 2023-11-21

## 2023-11-21 RX ORDER — OXYTOCIN/0.9 % SODIUM CHLORIDE 30/500 ML
250 PLASTIC BAG, INJECTION (ML) INTRAVENOUS CONTINUOUS
OUTPATIENT
Start: 2023-11-21 | End: 2023-11-21

## 2023-11-21 RX ORDER — CEFAZOLIN SODIUM 2 G/100ML
2000 INJECTION, SOLUTION INTRAVENOUS ONCE
OUTPATIENT
Start: 2023-11-21 | End: 2023-11-21

## 2023-11-21 RX ORDER — OXYTOCIN/0.9 % SODIUM CHLORIDE 30/500 ML
999 PLASTIC BAG, INJECTION (ML) INTRAVENOUS ONCE
OUTPATIENT
Start: 2023-11-21 | End: 2023-11-21

## 2023-11-21 RX ORDER — TRANEXAMIC ACID 10 MG/ML
1000 INJECTION, SOLUTION INTRAVENOUS ONCE AS NEEDED
OUTPATIENT
Start: 2023-11-21

## 2023-11-21 RX ORDER — SODIUM CHLORIDE 9 MG/ML
40 INJECTION, SOLUTION INTRAVENOUS AS NEEDED
OUTPATIENT
Start: 2023-11-21

## 2023-11-21 RX ORDER — MISOPROSTOL 200 UG/1
800 TABLET ORAL ONCE AS NEEDED
OUTPATIENT
Start: 2023-11-21

## 2023-11-21 RX ORDER — SODIUM CHLORIDE 0.9 % (FLUSH) 0.9 %
10 SYRINGE (ML) INJECTION AS NEEDED
OUTPATIENT
Start: 2023-11-21

## 2023-11-21 RX ORDER — CARBOPROST TROMETHAMINE 250 UG/ML
250 INJECTION, SOLUTION INTRAMUSCULAR
OUTPATIENT
Start: 2023-11-21

## 2023-11-21 RX ORDER — LIDOCAINE HYDROCHLORIDE 10 MG/ML
0.5 INJECTION, SOLUTION INFILTRATION; PERINEURAL ONCE AS NEEDED
OUTPATIENT
Start: 2023-11-21

## 2023-11-21 RX ORDER — KETOROLAC TROMETHAMINE 30 MG/ML
30 INJECTION, SOLUTION INTRAMUSCULAR; INTRAVENOUS ONCE
OUTPATIENT
Start: 2023-11-21 | End: 2023-11-21

## 2023-11-21 NOTE — PROGRESS NOTES
Chief Complaint   Patient presents with    Routine Prenatal Visit     Cc: ob check with growth Us - 1 hr gtt and tdap + rhogam , no ob complaints   Iv iron infusions helped tremendously still doing B12 infusions         HPI: 23 y.o.  at 28w1d presents  for prenatal care    Vitals:    23 0923 23 0925   BP:  118/68   Weight: 85.3 kg (188 lb) 85.3 kg (188 lb)     Total weight gain for pregnancy:  1.814 kg (4 lb)    Review of systems:   Gen: negative  CV:     negative  GI: negative  :   negative and good fetal movement noted   MS:    negative  Neuro: negative  Pul: negative    A/P  1. Intrauterine pregnancy at 28w1d   2. Pregnancy Risk:  HIGH RISK        Diagnoses and all orders for this visit:    1. Screening for iron deficiency anemia (Primary)  -     CBC (No Diff)  -     Gestational Screen 1 Hr ([facility])  -     POC Urinalysis Dipstick  -     Antibody Screen    2. Screening for diabetes mellitus  -     CBC (No Diff)  -     Gestational Screen 1 Hr ([facility])  -     POC Urinalysis Dipstick  -     Antibody Screen    3. Encounter for administration of vaccine  -     CBC (No Diff)  -     Gestational Screen 1 Hr ([facility])  -     POC Urinalysis Dipstick  -     Antibody Screen    4. Screening for blood or protein in urine  -     CBC (No Diff)  -     Gestational Screen 1 Hr ([facility])  -     POC Urinalysis Dipstick  -     Antibody Screen    5. Previous  section  -     Case Request    6. Rh negative status during pregnancy in third trimester    7. COVID-19 affecting pregnancy in second trimester    8. Maternal anemia in pregnancy, antepartum    9. Rubella non-immune status, antepartum    10. Maternal varicella, non-immune    11. Prenatal care, subsequent pregnancy in third trimester    Other orders  -     Tdap Vaccine Greater Than or Equal To 8yo IM  -     Rhogam Immune Globulin Immunization         labor was discussed.  Warnings were provided.  Nutrition and weight gain were  addressed.  -----------------------  PLAN:   Return in about 2 weeks (around 12/5/2023), or ob check.  COVID this pregnancy-continue aspirin-normal fetal growth today  Rh--RhoGAM today  1 hour and Tdap today  Repeat section scheduled at 39 weeks    Skylar Singh MD  11/21/2023 09:45 EST

## 2023-11-22 PROBLEM — O99.810 ABNORMAL GLUCOSE TOLERANCE TEST (GTT) DURING PREGNANCY, ANTEPARTUM: Status: ACTIVE | Noted: 2023-11-22

## 2023-11-22 LAB
BLD GP AB SCN SERPL QL: NEGATIVE
ERYTHROCYTE [DISTWIDTH] IN BLOOD BY AUTOMATED COUNT: 18.9 % (ref 11.7–15.4)
GLUCOSE 1H P 50 G GLC PO SERPL-MCNC: 149 MG/DL (ref 70–139)
HCT VFR BLD AUTO: 27.6 % (ref 34–46.6)
HGB BLD-MCNC: 8.9 G/DL (ref 11.1–15.9)
MCH RBC QN AUTO: 27.2 PG (ref 26.6–33)
MCHC RBC AUTO-ENTMCNC: 32.2 G/DL (ref 31.5–35.7)
MCV RBC AUTO: 84 FL (ref 79–97)
PLATELET # BLD AUTO: 261 X10E3/UL (ref 150–450)
RBC # BLD AUTO: 3.27 X10E6/UL (ref 3.77–5.28)
WBC # BLD AUTO: 7.3 X10E3/UL (ref 3.4–10.8)

## 2023-11-22 NOTE — PROGRESS NOTES
Call patient and notify of abnormal 1 hour glucose results-schedule three hour please.  Also very anemic.  Please let me know what dose and how often she is taking iron at this time.

## 2023-12-05 ENCOUNTER — ROUTINE PRENATAL (OUTPATIENT)
Dept: OBSTETRICS AND GYNECOLOGY | Age: 23
End: 2023-12-05
Payer: COMMERCIAL

## 2023-12-05 VITALS — SYSTOLIC BLOOD PRESSURE: 104 MMHG | WEIGHT: 190 LBS | BODY MASS INDEX: 33.66 KG/M2 | DIASTOLIC BLOOD PRESSURE: 60 MMHG

## 2023-12-05 DIAGNOSIS — Z98.891 PREVIOUS CESAREAN SECTION: ICD-10-CM

## 2023-12-05 DIAGNOSIS — Z13.89 SCREENING FOR BLOOD OR PROTEIN IN URINE: ICD-10-CM

## 2023-12-05 DIAGNOSIS — O99.019 MATERNAL ANEMIA IN PREGNANCY, ANTEPARTUM: ICD-10-CM

## 2023-12-05 DIAGNOSIS — O99.810 ABNORMAL GLUCOSE TOLERANCE TEST (GTT) DURING PREGNANCY, ANTEPARTUM: ICD-10-CM

## 2023-12-05 DIAGNOSIS — O26.893 RH NEGATIVE STATUS DURING PREGNANCY IN THIRD TRIMESTER: ICD-10-CM

## 2023-12-05 DIAGNOSIS — Z67.91 RH NEGATIVE STATUS DURING PREGNANCY IN THIRD TRIMESTER: ICD-10-CM

## 2023-12-05 DIAGNOSIS — Z28.39 RUBELLA NON-IMMUNE STATUS, ANTEPARTUM: ICD-10-CM

## 2023-12-05 DIAGNOSIS — O09.899 RUBELLA NON-IMMUNE STATUS, ANTEPARTUM: ICD-10-CM

## 2023-12-05 DIAGNOSIS — O09.899 MATERNAL VARICELLA, NON-IMMUNE: ICD-10-CM

## 2023-12-05 DIAGNOSIS — U07.1 COVID-19 AFFECTING PREGNANCY IN SECOND TRIMESTER: ICD-10-CM

## 2023-12-05 DIAGNOSIS — Z28.39 MATERNAL VARICELLA, NON-IMMUNE: ICD-10-CM

## 2023-12-05 DIAGNOSIS — Z34.83 PRENATAL CARE, SUBSEQUENT PREGNANCY IN THIRD TRIMESTER: Primary | ICD-10-CM

## 2023-12-05 DIAGNOSIS — O98.512 COVID-19 AFFECTING PREGNANCY IN SECOND TRIMESTER: ICD-10-CM

## 2023-12-05 LAB
BILIRUB BLD-MCNC: NEGATIVE MG/DL
GLUCOSE UR STRIP-MCNC: NEGATIVE MG/DL
KETONES UR QL: ABNORMAL
LEUKOCYTE EST, POC: NEGATIVE
NITRITE UR-MCNC: NEGATIVE MG/ML
PH UR: 6 [PH] (ref 5–8)
PROT UR STRIP-MCNC: NEGATIVE MG/DL
RBC # UR STRIP: NEGATIVE /UL
SP GR UR: 1.01 (ref 1–1.03)
UROBILINOGEN UR QL: NORMAL

## 2023-12-05 NOTE — PROGRESS NOTES
Chief Complaint   Patient presents with    Routine Prenatal Visit     Cc: ob check , no ob complaints , reports good FM , 3 hr gtt re - scheduled for this Thursday , takes daily iron 325 , had B12 infusion  has one left        HPI: 23 y.o.  at 30w1d presents for prenatal care    Vitals:    23 1126   BP: 104/60   Weight: 86.2 kg (190 lb)     Total weight gain for pregnancy:  2.722 kg (6 lb)    Review of systems:   Gen: negative  CV:     negative  GI: negative  :   negative and good fetal movement noted   MS:    negative  Neuro: negative  Pul: negative    A/P  1. Intrauterine pregnancy at 30w1d   2. Pregnancy Risk:  HIGH RISK        Diagnoses and all orders for this visit:    1. Prenatal care, subsequent pregnancy in third trimester (Primary)    2. Screening for blood or protein in urine  -     POC Urinalysis Dipstick    3. Abnormal glucose tolerance test (GTT) during pregnancy, antepartum    4. Maternal varicella, non-immune    5. Previous  section    6. Rh negative status during pregnancy in third trimester    7. COVID-19 affecting pregnancy in second trimester    8. Maternal anemia in pregnancy, antepartum    9. Rubella non-immune status, antepartum         labor was discussed.  Warnings were provided.  Nutrition and weight gain were addressed.  -----------------------  PLAN:   Return in about 2 weeks (around 2023), or ob check and growth US.  COVID this pregnancy-check growth next visit  Rh--status post RhoGAM  Abnormal 1 hour glucose tolerance test-3-hour scheduled this week  Repeat section scheduled at 39 weeks    Skylar Singh MD  2023 17:45 EST

## 2023-12-07 DIAGNOSIS — Z34.82 PRENATAL CARE, SUBSEQUENT PREGNANCY, SECOND TRIMESTER: Primary | ICD-10-CM

## 2023-12-19 ENCOUNTER — ROUTINE PRENATAL (OUTPATIENT)
Dept: OBSTETRICS AND GYNECOLOGY | Age: 23
End: 2023-12-19
Payer: COMMERCIAL

## 2023-12-19 VITALS — BODY MASS INDEX: 33.3 KG/M2 | SYSTOLIC BLOOD PRESSURE: 100 MMHG | WEIGHT: 188 LBS | DIASTOLIC BLOOD PRESSURE: 60 MMHG

## 2023-12-19 DIAGNOSIS — Z34.83 PRENATAL CARE, SUBSEQUENT PREGNANCY IN THIRD TRIMESTER: Primary | ICD-10-CM

## 2023-12-19 DIAGNOSIS — O98.512 COVID-19 AFFECTING PREGNANCY IN SECOND TRIMESTER: ICD-10-CM

## 2023-12-19 DIAGNOSIS — U07.1 COVID-19 AFFECTING PREGNANCY IN SECOND TRIMESTER: ICD-10-CM

## 2023-12-19 DIAGNOSIS — Z67.91 RH NEGATIVE STATUS DURING PREGNANCY IN THIRD TRIMESTER: ICD-10-CM

## 2023-12-19 DIAGNOSIS — Z98.891 PREVIOUS CESAREAN SECTION: ICD-10-CM

## 2023-12-19 DIAGNOSIS — Z13.89 SCREENING FOR BLOOD OR PROTEIN IN URINE: ICD-10-CM

## 2023-12-19 DIAGNOSIS — Z28.39 RUBELLA NON-IMMUNE STATUS, ANTEPARTUM: ICD-10-CM

## 2023-12-19 DIAGNOSIS — O26.893 RH NEGATIVE STATUS DURING PREGNANCY IN THIRD TRIMESTER: ICD-10-CM

## 2023-12-19 DIAGNOSIS — O99.810 ABNORMAL GLUCOSE TOLERANCE TEST (GTT) DURING PREGNANCY, ANTEPARTUM: ICD-10-CM

## 2023-12-19 DIAGNOSIS — O99.019 MATERNAL ANEMIA IN PREGNANCY, ANTEPARTUM: ICD-10-CM

## 2023-12-19 DIAGNOSIS — O09.899 MATERNAL VARICELLA, NON-IMMUNE: ICD-10-CM

## 2023-12-19 DIAGNOSIS — O09.899 RUBELLA NON-IMMUNE STATUS, ANTEPARTUM: ICD-10-CM

## 2023-12-19 DIAGNOSIS — Z28.39 MATERNAL VARICELLA, NON-IMMUNE: ICD-10-CM

## 2023-12-19 LAB
BILIRUB BLD-MCNC: ABNORMAL MG/DL
GLUCOSE UR STRIP-MCNC: NEGATIVE MG/DL
KETONES UR QL: ABNORMAL
LEUKOCYTE EST, POC: ABNORMAL
NITRITE UR-MCNC: NEGATIVE MG/ML
PH UR: 6 [PH] (ref 5–8)
PROT UR STRIP-MCNC: ABNORMAL MG/DL
RBC # UR STRIP: NEGATIVE /UL
SP GR UR: 1.03 (ref 1–1.03)
UROBILINOGEN UR QL: ABNORMAL

## 2023-12-19 NOTE — PROGRESS NOTES
Chief Complaint   Patient presents with    Routine Prenatal Visit     Cc: ob check, no ob complaints - RSV vaccine today patient declines flu vaccine  , 2 more iron -b12 infusions left        HPI: 23 y.o.  at 32w1d presents for prenatal care    Vitals:    23 1308   BP: 100/60   Weight: 85.3 kg (188 lb)     Total weight gain for pregnancy:  1.814 kg (4 lb)    Review of systems:   Gen: negative  CV:     negative  GI: negative  :   negative and good fetal movement noted   MS:    negative  Neuro: negative  Pul: negative    A/P  1. Intrauterine pregnancy at 32w1d   2. Pregnancy Risk:  HIGH RISK        Diagnoses and all orders for this visit:    1. Prenatal care, subsequent pregnancy in third trimester (Primary)    2. Screening for blood or protein in urine  -     POC Urinalysis Dipstick    3. Abnormal glucose tolerance test (GTT) during pregnancy, antepartum    4. Maternal varicella, non-immune    5. Previous  section    6. Rh negative status during pregnancy in third trimester    7. Maternal anemia in pregnancy, antepartum    8. Rubella non-immune status, antepartum    9. COVID-19 affecting pregnancy in second trimester    Other orders  -     ABRYSVO Vaccine (RSV for Pregnant Women 32-36 wks)         labor was discussed.  Warnings were provided.  Nutrition and weight gain were addressed.  -----------------------  PLAN:   Return in about 2 weeks (around 2024), or ob check and growth US.  RSV vaccine today  Low weight gain-check fetal growth next visit  Rh--status post RhoGAM    Skylar Singh MD  2023 13:25 EST

## 2024-01-02 ENCOUNTER — ROUTINE PRENATAL (OUTPATIENT)
Dept: OBSTETRICS AND GYNECOLOGY | Age: 24
End: 2024-01-02
Payer: COMMERCIAL

## 2024-01-02 ENCOUNTER — TELEPHONE (OUTPATIENT)
Dept: OBSTETRICS AND GYNECOLOGY | Age: 24
End: 2024-01-02

## 2024-01-02 VITALS — WEIGHT: 193 LBS | SYSTOLIC BLOOD PRESSURE: 112 MMHG | BODY MASS INDEX: 34.19 KG/M2 | DIASTOLIC BLOOD PRESSURE: 66 MMHG

## 2024-01-02 DIAGNOSIS — O09.899 RUBELLA NON-IMMUNE STATUS, ANTEPARTUM: ICD-10-CM

## 2024-01-02 DIAGNOSIS — O99.810 ABNORMAL GLUCOSE TOLERANCE TEST (GTT) DURING PREGNANCY, ANTEPARTUM: ICD-10-CM

## 2024-01-02 DIAGNOSIS — O99.019 MATERNAL ANEMIA IN PREGNANCY, ANTEPARTUM: ICD-10-CM

## 2024-01-02 DIAGNOSIS — O28.8 AFI (AMNIOTIC FLUID INDEX) BORDERLINE LOW: ICD-10-CM

## 2024-01-02 DIAGNOSIS — Z98.891 PREVIOUS CESAREAN SECTION: ICD-10-CM

## 2024-01-02 DIAGNOSIS — O26.893 RH NEGATIVE STATUS DURING PREGNANCY IN THIRD TRIMESTER: ICD-10-CM

## 2024-01-02 DIAGNOSIS — Z28.39 RUBELLA NON-IMMUNE STATUS, ANTEPARTUM: ICD-10-CM

## 2024-01-02 DIAGNOSIS — Z34.83 PRENATAL CARE, SUBSEQUENT PREGNANCY IN THIRD TRIMESTER: Primary | ICD-10-CM

## 2024-01-02 DIAGNOSIS — Z28.39 MATERNAL VARICELLA, NON-IMMUNE: ICD-10-CM

## 2024-01-02 DIAGNOSIS — U07.1 COVID-19 AFFECTING PREGNANCY IN SECOND TRIMESTER: ICD-10-CM

## 2024-01-02 DIAGNOSIS — O98.512 COVID-19 AFFECTING PREGNANCY IN SECOND TRIMESTER: ICD-10-CM

## 2024-01-02 DIAGNOSIS — Z13.89 SCREENING FOR BLOOD OR PROTEIN IN URINE: ICD-10-CM

## 2024-01-02 DIAGNOSIS — O09.899 MATERNAL VARICELLA, NON-IMMUNE: ICD-10-CM

## 2024-01-02 DIAGNOSIS — Z67.91 RH NEGATIVE STATUS DURING PREGNANCY IN THIRD TRIMESTER: ICD-10-CM

## 2024-01-02 PROBLEM — Z30.2 REQUEST FOR STERILIZATION: Status: ACTIVE | Noted: 2024-01-02

## 2024-01-02 LAB
BILIRUB BLD-MCNC: NEGATIVE MG/DL
GLUCOSE UR STRIP-MCNC: NEGATIVE MG/DL
KETONES UR QL: NEGATIVE
LEUKOCYTE EST, POC: ABNORMAL
NITRITE UR-MCNC: NEGATIVE MG/ML
PH UR: 6.5 [PH] (ref 5–8)
PROT UR STRIP-MCNC: NEGATIVE MG/DL
RBC # UR STRIP: NEGATIVE /UL
SP GR UR: 1 (ref 1–1.03)
UROBILINOGEN UR QL: NORMAL

## 2024-01-02 RX ORDER — FERROUS SULFATE 325(65) MG
325 TABLET ORAL
Qty: 90 TABLET | Refills: 3 | Status: SHIPPED | OUTPATIENT
Start: 2024-01-02

## 2024-01-02 NOTE — TELEPHONE ENCOUNTER
Caller: Madie Givens    Relationship to patient: Self    Best call back number: [phone]    Type of visit: OB F/U    Requested date: TODAY      If rescheduling, when is the original appointment: TODAY     Additional notes: PATIENT WOULD LIKE TO PUSH BACK U/S APPT TO 2:00 W/ OB F/U RIGHT AFTER     HUB UNABLE TO WARM TRANSFER TO NON-CLINICAL LINE

## 2024-01-02 NOTE — PROGRESS NOTES
Chief Complaint   Patient presents with    Routine Prenatal Visit     CC: Ob check, 34w1d, had U/S today, no complaints, needs iron refill        HPI: 23 y.o.  at 34w1d presents for prenatal care    Vitals:    24 1141   BP: 112/66   Weight: 87.5 kg (193 lb)     Total weight gain for pregnancy:  4.082 kg (9 lb)    Review of systems:   Gen: negative  CV:     negative  GI: negative  :   negative and good fetal movement noted   MS:    negative  Neuro: negative  Pul: negative    A/P  1. Intrauterine pregnancy at 34w1d   2. Pregnancy Risk:  HIGH RISK        Diagnoses and all orders for this visit:    1. Screening for blood or protein in urine (Primary)  -     POC Urinalysis Dipstick    2. MARIA M (amniotic fluid index) borderline low    3. Abnormal glucose tolerance test (GTT) during pregnancy, antepartum    4. Maternal varicella, non-immune    5. Previous  section    6. Rh negative status during pregnancy in third trimester    7. Maternal anemia in pregnancy, antepartum  -     ferrous sulfate 325 (65 FE) MG tablet; Take 1 tablet by mouth Daily With Breakfast.  Dispense: 90 tablet; Refill: 3    8. Rubella non-immune status, antepartum    9. COVID-19 affecting pregnancy in second trimester         labor was discussed.  Warnings were provided.  Nutrition and weight gain were addressed.  -----------------------  PLAN:   Return in about 13 days (around 1/15/2024), or ob check and BPP.  Borderline low MARIA M today-weekly BPP's  Normal fetal growth today  Anemia-status post IV iron infusions-continue oral iron-  Rh--status post RhoGAM    Skylar Singh MD  2024 11:48 EST

## 2024-01-09 ENCOUNTER — ROUTINE PRENATAL (OUTPATIENT)
Dept: OBSTETRICS AND GYNECOLOGY | Age: 24
End: 2024-01-09
Payer: COMMERCIAL

## 2024-01-09 VITALS — SYSTOLIC BLOOD PRESSURE: 108 MMHG | WEIGHT: 191 LBS | DIASTOLIC BLOOD PRESSURE: 64 MMHG | BODY MASS INDEX: 33.83 KG/M2

## 2024-01-09 DIAGNOSIS — O98.512 COVID-19 AFFECTING PREGNANCY IN SECOND TRIMESTER: ICD-10-CM

## 2024-01-09 DIAGNOSIS — O99.810 ABNORMAL GLUCOSE TOLERANCE TEST (GTT) DURING PREGNANCY, ANTEPARTUM: ICD-10-CM

## 2024-01-09 DIAGNOSIS — O09.899 RUBELLA NON-IMMUNE STATUS, ANTEPARTUM: ICD-10-CM

## 2024-01-09 DIAGNOSIS — Z36.85 ANTENATAL SCREENING FOR STREPTOCOCCUS B: ICD-10-CM

## 2024-01-09 DIAGNOSIS — Z13.89 SCREENING FOR BLOOD OR PROTEIN IN URINE: Primary | ICD-10-CM

## 2024-01-09 DIAGNOSIS — Z98.891 PREVIOUS CESAREAN SECTION: ICD-10-CM

## 2024-01-09 DIAGNOSIS — Z28.39 MATERNAL VARICELLA, NON-IMMUNE: ICD-10-CM

## 2024-01-09 DIAGNOSIS — O09.899 MATERNAL VARICELLA, NON-IMMUNE: ICD-10-CM

## 2024-01-09 DIAGNOSIS — Z28.39 RUBELLA NON-IMMUNE STATUS, ANTEPARTUM: ICD-10-CM

## 2024-01-09 DIAGNOSIS — Z34.83 PRENATAL CARE, SUBSEQUENT PREGNANCY IN THIRD TRIMESTER: ICD-10-CM

## 2024-01-09 DIAGNOSIS — O26.893 RH NEGATIVE STATUS DURING PREGNANCY IN THIRD TRIMESTER: ICD-10-CM

## 2024-01-09 DIAGNOSIS — O99.019 MATERNAL ANEMIA IN PREGNANCY, ANTEPARTUM: ICD-10-CM

## 2024-01-09 DIAGNOSIS — Z67.91 RH NEGATIVE STATUS DURING PREGNANCY IN THIRD TRIMESTER: ICD-10-CM

## 2024-01-09 DIAGNOSIS — O28.8 AFI (AMNIOTIC FLUID INDEX) BORDERLINE LOW: ICD-10-CM

## 2024-01-09 DIAGNOSIS — Z30.2 REQUEST FOR STERILIZATION: ICD-10-CM

## 2024-01-09 DIAGNOSIS — U07.1 COVID-19 AFFECTING PREGNANCY IN SECOND TRIMESTER: ICD-10-CM

## 2024-01-09 LAB
BILIRUB BLD-MCNC: NEGATIVE MG/DL
GLUCOSE UR STRIP-MCNC: NEGATIVE MG/DL
KETONES UR QL: NEGATIVE
LEUKOCYTE EST, POC: NEGATIVE
NITRITE UR-MCNC: NEGATIVE MG/ML
PH UR: 6.5 [PH] (ref 5–8)
PROT UR STRIP-MCNC: NEGATIVE MG/DL
RBC # UR STRIP: NEGATIVE /UL
SP GR UR: 1.01 (ref 1–1.03)
UROBILINOGEN UR QL: NORMAL

## 2024-01-09 NOTE — PROGRESS NOTES
Chief Complaint   Patient presents with    Routine Prenatal Visit     CC: ob check with B[facility] Us today , GBS today ,  upd with all the vaccines , no ob complaints        HPI: 23 y.o.  at 35w1d presents for prenatal care    Vitals:    24 1058   BP: 108/64   Weight: 86.6 kg (191 lb)     Total weight gain for pregnancy:  3.175 kg (7 lb)    Review of systems:   Gen: negative  CV:     negative  GI: negative  :   negative and good fetal movement noted   MS:    negative  Neuro: negative  Pul: negative    A/P  1. Intrauterine pregnancy at 35w1d   2. Pregnancy Risk:  HIGH RISK        Diagnoses and all orders for this visit:    1. Screening for blood or protein in urine (Primary)  -     POC Urinalysis Dipstick    2.  screening for streptococcus B  -     Group B Streptococcus Culture - Swab, Vaginal/Rectum    3. Abnormal glucose tolerance test (GTT) during pregnancy, antepartum    4. Request for sterilization    5. MARIA M (amniotic fluid index) borderline low    6. Maternal varicella, non-immune    7. Previous  section    8. Rh negative status during pregnancy in third trimester    9. Rubella non-immune status, antepartum    10. Maternal anemia in pregnancy, antepartum    11. COVID-19 affecting pregnancy in second trimester         labor was discussed.  Warnings were provided.  -----------------------  PLAN:   Return in about 6 days (around 1/15/2024), or ob check and BPP.  Borderline low MARIA M-10 cm today  Rh--status post RhoGAM  Anemia continue oral iron-  Repeat section and tubal at 39 weeks    Skylar Singh MD  2024 11:20 EST

## 2024-01-13 LAB — B-HEM STREP SPEC QL CULT: NEGATIVE

## 2024-01-15 ENCOUNTER — ROUTINE PRENATAL (OUTPATIENT)
Dept: OBSTETRICS AND GYNECOLOGY | Age: 24
End: 2024-01-15
Payer: COMMERCIAL

## 2024-01-15 VITALS — WEIGHT: 194 LBS | DIASTOLIC BLOOD PRESSURE: 68 MMHG | BODY MASS INDEX: 34.37 KG/M2 | SYSTOLIC BLOOD PRESSURE: 124 MMHG

## 2024-01-15 DIAGNOSIS — Z98.891 PREVIOUS CESAREAN SECTION: ICD-10-CM

## 2024-01-15 DIAGNOSIS — Z67.91 RH NEGATIVE STATUS DURING PREGNANCY IN THIRD TRIMESTER: ICD-10-CM

## 2024-01-15 DIAGNOSIS — Z13.89 SCREENING FOR BLOOD OR PROTEIN IN URINE: ICD-10-CM

## 2024-01-15 DIAGNOSIS — O09.899 RUBELLA NON-IMMUNE STATUS, ANTEPARTUM: ICD-10-CM

## 2024-01-15 DIAGNOSIS — Z28.39 RUBELLA NON-IMMUNE STATUS, ANTEPARTUM: ICD-10-CM

## 2024-01-15 DIAGNOSIS — U07.1 COVID-19 AFFECTING PREGNANCY IN SECOND TRIMESTER: ICD-10-CM

## 2024-01-15 DIAGNOSIS — O26.893 RH NEGATIVE STATUS DURING PREGNANCY IN THIRD TRIMESTER: ICD-10-CM

## 2024-01-15 DIAGNOSIS — Z3A.36 36 WEEKS GESTATION OF PREGNANCY: Primary | ICD-10-CM

## 2024-01-15 DIAGNOSIS — O99.810 ABNORMAL GLUCOSE TOLERANCE TEST (GTT) DURING PREGNANCY, ANTEPARTUM: ICD-10-CM

## 2024-01-15 DIAGNOSIS — O98.512 COVID-19 AFFECTING PREGNANCY IN SECOND TRIMESTER: ICD-10-CM

## 2024-01-15 LAB
BILIRUB BLD-MCNC: NEGATIVE MG/DL
CLARITY, POC: CLEAR
COLOR UR: YELLOW
GLUCOSE UR STRIP-MCNC: NEGATIVE MG/DL
KETONES UR QL: NEGATIVE
LEUKOCYTE EST, POC: ABNORMAL
NITRITE UR-MCNC: NEGATIVE MG/ML
PH UR: 7 [PH] (ref 5–8)
PROT UR STRIP-MCNC: NEGATIVE MG/DL
RBC # UR STRIP: NEGATIVE /UL
SP GR UR: 1.02 (ref 1–1.03)
UROBILINOGEN UR QL: ABNORMAL

## 2024-01-15 NOTE — PROGRESS NOTES
Chief Complaint   Patient presents with    Routine Prenatal Visit     Ob check, 36w0d, bpp today, pt states she has been having some pelvic pain/pressure but she is unsure if she is having contractions because she never had them with her daughter       HPI: 23 y.o.  at 36w0d   Doing well +FM, no lof or VB  Having some pelvic pain that comes and goes   This started yesterday and stopped with rest  She would like to have her cervix checked to make sure today  Taking iron and pnv  GBS negative    Vitals:    01/15/24 1308   BP: 124/68   Weight: 88 kg (194 lb)     Total weight gain for pregnancy:  4.536 kg (10 lb)    Review of systems:   Gen: negative  CV:     negative  GI: negative  :   good fetal movement noted , jose ferro type contractions, and pelvic pressure  MS:    negative  Neuro: denies headaches and visual changes   Pul: negative    A/P  1. Intrauterine pregnancy at 36w0d   2. Pregnancy Risk:  HIGH RISK        Diagnoses and all orders for this visit:    1. 36 weeks gestation of pregnancy (Primary)    2. Screening for blood or protein in urine  -     POC Urinalysis Dipstick    3. Previous  section    4. Rubella non-immune status, antepartum    5. Rh negative status during pregnancy in third trimester    6. COVID-19 affecting pregnancy in second trimester    7. Abnormal glucose tolerance test (GTT) during pregnancy, antepartum        Routine labor warnings were discussed and indications for L & D f/u including bleeding, regular contractions, decreased fetal movement or/and rupture of membranes.   -----------------------  PLAN: RCS reviewed s/s of labor and when to go to L&D   Return in about 1 week (around 2024) for Next scheduled follow up.      Skylar Beth, APRN  1/15/2024 13:23 EST

## 2024-01-22 ENCOUNTER — TELEPHONE (OUTPATIENT)
Dept: OBSTETRICS AND GYNECOLOGY | Age: 24
End: 2024-01-22
Payer: COMMERCIAL

## 2024-01-22 NOTE — TELEPHONE ENCOUNTER
Pt is 37w0d calling stating she has been having severe hot flashes & c/o fingers being so cold that they are going numb. Pt denies any contractions, fluid loss or decrease in fetal movement. Pt next ob check scheduled 1/23/24. Pt asking if she should be concerned or if ok to wait for appt tomorrow? Please advise.

## 2024-01-23 ENCOUNTER — HOSPITAL ENCOUNTER (EMERGENCY)
Facility: HOSPITAL | Age: 24
Discharge: HOME | End: 2024-01-23
Attending: OBSTETRICS & GYNECOLOGY | Admitting: OBSTETRICS & GYNECOLOGY
Payer: COMMERCIAL

## 2024-01-23 ENCOUNTER — ROUTINE PRENATAL (OUTPATIENT)
Dept: OBSTETRICS AND GYNECOLOGY | Age: 24
End: 2024-01-23
Payer: COMMERCIAL

## 2024-01-23 VITALS
SYSTOLIC BLOOD PRESSURE: 114 MMHG | HEART RATE: 81 BPM | HEIGHT: 64 IN | DIASTOLIC BLOOD PRESSURE: 55 MMHG | BODY MASS INDEX: 33.13 KG/M2

## 2024-01-23 VITALS — SYSTOLIC BLOOD PRESSURE: 120 MMHG | WEIGHT: 193 LBS | DIASTOLIC BLOOD PRESSURE: 74 MMHG | BODY MASS INDEX: 34.19 KG/M2

## 2024-01-23 DIAGNOSIS — O09.899 RUBELLA NON-IMMUNE STATUS, ANTEPARTUM: ICD-10-CM

## 2024-01-23 DIAGNOSIS — O99.810 ABNORMAL GLUCOSE TOLERANCE TEST (GTT) DURING PREGNANCY, ANTEPARTUM: ICD-10-CM

## 2024-01-23 DIAGNOSIS — O28.8 AFI (AMNIOTIC FLUID INDEX) BORDERLINE LOW: ICD-10-CM

## 2024-01-23 DIAGNOSIS — O98.512 COVID-19 AFFECTING PREGNANCY IN SECOND TRIMESTER: ICD-10-CM

## 2024-01-23 DIAGNOSIS — U07.1 COVID-19 AFFECTING PREGNANCY IN SECOND TRIMESTER: ICD-10-CM

## 2024-01-23 DIAGNOSIS — Z28.39 RUBELLA NON-IMMUNE STATUS, ANTEPARTUM: ICD-10-CM

## 2024-01-23 DIAGNOSIS — O09.899 MATERNAL VARICELLA, NON-IMMUNE: ICD-10-CM

## 2024-01-23 DIAGNOSIS — Z98.891 PREVIOUS CESAREAN SECTION: ICD-10-CM

## 2024-01-23 DIAGNOSIS — O99.019 MATERNAL ANEMIA IN PREGNANCY, ANTEPARTUM: ICD-10-CM

## 2024-01-23 DIAGNOSIS — Z67.91 RH NEGATIVE STATUS DURING PREGNANCY IN THIRD TRIMESTER: ICD-10-CM

## 2024-01-23 DIAGNOSIS — Z13.89 SCREENING FOR BLOOD OR PROTEIN IN URINE: ICD-10-CM

## 2024-01-23 DIAGNOSIS — Z30.2 REQUEST FOR STERILIZATION: ICD-10-CM

## 2024-01-23 DIAGNOSIS — Z34.83 PRENATAL CARE, SUBSEQUENT PREGNANCY IN THIRD TRIMESTER: Primary | ICD-10-CM

## 2024-01-23 DIAGNOSIS — Z28.39 MATERNAL VARICELLA, NON-IMMUNE: ICD-10-CM

## 2024-01-23 DIAGNOSIS — O26.893 RH NEGATIVE STATUS DURING PREGNANCY IN THIRD TRIMESTER: ICD-10-CM

## 2024-01-23 LAB
A1 MICROGLOB PLACENTAL VAG QL: NEGATIVE
BACTERIA UR QL AUTO: ABNORMAL /HPF
BILIRUB BLD-MCNC: NEGATIVE MG/DL
BILIRUB UR QL STRIP: NEGATIVE
CLARITY UR: CLEAR
COLOR UR: YELLOW
GLUCOSE UR STRIP-MCNC: NEGATIVE MG/DL
GLUCOSE UR STRIP-MCNC: NEGATIVE MG/DL
HGB UR QL STRIP.AUTO: NEGATIVE
HYALINE CASTS UR QL AUTO: ABNORMAL /LPF
KETONES UR QL STRIP: NEGATIVE
KETONES UR QL: NEGATIVE
LEUKOCYTE EST, POC: ABNORMAL
LEUKOCYTE ESTERASE UR QL STRIP.AUTO: ABNORMAL
NITRITE UR QL STRIP: NEGATIVE
NITRITE UR-MCNC: NEGATIVE MG/ML
PH UR STRIP.AUTO: 7 [PH] (ref 5–8)
PH UR: 7 [PH] (ref 5–8)
PROT UR QL STRIP: NEGATIVE
PROT UR STRIP-MCNC: NEGATIVE MG/DL
RBC # UR STRIP: ABNORMAL /HPF
RBC # UR STRIP: NEGATIVE /UL
REF LAB TEST METHOD: ABNORMAL
SP GR UR STRIP: 1.01 (ref 1–1.03)
SP GR UR: 1.01 (ref 1–1.03)
SQUAMOUS #/AREA URNS HPF: ABNORMAL /HPF
UROBILINOGEN UR QL STRIP: ABNORMAL
UROBILINOGEN UR QL: NORMAL
WBC # UR STRIP: ABNORMAL /HPF

## 2024-01-23 PROCEDURE — 87086 URINE CULTURE/COLONY COUNT: CPT | Performed by: OBSTETRICS & GYNECOLOGY

## 2024-01-23 PROCEDURE — 84112 EVAL AMNIOTIC FLUID PROTEIN: CPT | Performed by: OBSTETRICS & GYNECOLOGY

## 2024-01-23 PROCEDURE — 81001 URINALYSIS AUTO W/SCOPE: CPT | Performed by: OBSTETRICS & GYNECOLOGY

## 2024-01-23 PROCEDURE — 99284 EMERGENCY DEPT VISIT MOD MDM: CPT | Performed by: OBSTETRICS & GYNECOLOGY

## 2024-01-23 PROCEDURE — 59025 FETAL NON-STRESS TEST: CPT

## 2024-01-23 NOTE — PROGRESS NOTES
Chief Complaint   Patient presents with    Routine Prenatal Visit     Cc: ob heck with BPP Us today, GBS neg , upd with all the vaccines , today c/o having pelvic pressure denies any vag bleeding or fluid loss , does not want a cervical exam.        HPI: 23 y.o.  at 37w1d presents for prenatal care    Vitals:    24 1128   BP: 120/74   Weight: 87.5 kg (193 lb)     Total weight gain for pregnancy:  4.082 kg (9 lb)    Review of systems:   Gen: negative  CV:     negative  GI: negative  :   negative and good fetal movement noted   MS:    negative  Neuro: negative  Pul: negative    A/P  1. Intrauterine pregnancy at 37w1d   2. Pregnancy Risk:  HIGH RISK        Diagnoses and all orders for this visit:    1. Prenatal care, subsequent pregnancy in third trimester (Primary)    2. Screening for blood or protein in urine  -     POC Urinalysis Dipstick    3. Abnormal glucose tolerance test (GTT) during pregnancy, antepartum    4. Request for sterilization    5. MARIA M (amniotic fluid index) borderline low    6. Maternal varicella, non-immune    7. Previous  section    8. Rh negative status during pregnancy in third trimester    9. Rubella non-immune status, antepartum    10. Maternal anemia in pregnancy, antepartum    11. COVID-19 affecting pregnancy in second trimester          -----------------------  PLAN:   Return in about 1 week (around 2024), or ob check and BPP.  Borderline low MARIA M today-repeat MARIA M tomorrow  Labor warnings and fetal kick counts given  Repeat  section and tubal scheduled at 39 weeks  Rh--status post RhoGAM    Skylar Singh MD  2024 11:39 EST

## 2024-01-24 ENCOUNTER — ANESTHESIA (OUTPATIENT)
Dept: LABOR AND DELIVERY | Facility: HOSPITAL | Age: 24
End: 2024-01-24
Payer: COMMERCIAL

## 2024-01-24 ENCOUNTER — ANESTHESIA EVENT (OUTPATIENT)
Dept: LABOR AND DELIVERY | Facility: HOSPITAL | Age: 24
End: 2024-01-24
Payer: COMMERCIAL

## 2024-01-24 ENCOUNTER — HOSPITAL ENCOUNTER (INPATIENT)
Facility: HOSPITAL | Age: 24
LOS: 2 days | Discharge: HOME | End: 2024-01-26
Attending: OBSTETRICS & GYNECOLOGY | Admitting: OBSTETRICS & GYNECOLOGY
Payer: COMMERCIAL

## 2024-01-24 DIAGNOSIS — Z98.891 PREVIOUS CESAREAN SECTION: ICD-10-CM

## 2024-01-24 PROBLEM — O28.8 AFI (AMNIOTIC FLUID INDEX) BORDERLINE LOW: Status: RESOLVED | Noted: 2024-01-02 | Resolved: 2024-01-24

## 2024-01-24 PROBLEM — O41.03X0 OLIGOHYDRAMNIOS IN THIRD TRIMESTER: Status: ACTIVE | Noted: 2024-01-24

## 2024-01-24 LAB
ABO GROUP BLD: NORMAL
ABO GROUP BLD: NORMAL
ALBUMIN SERPL-MCNC: 3.7 G/DL (ref 3.5–5.2)
ALBUMIN/GLOB SERPL: 1.2 G/DL
ALP SERPL-CCNC: 159 U/L (ref 39–117)
ALT SERPL W P-5'-P-CCNC: 8 U/L (ref 1–33)
ANION GAP SERPL CALCULATED.3IONS-SCNC: 11.3 MMOL/L (ref 5–15)
AST SERPL-CCNC: 11 U/L (ref 1–32)
BACTERIA SPEC AEROBE CULT: NO GROWTH
BASOPHILS # BLD AUTO: 0.02 10*3/MM3 (ref 0–0.2)
BASOPHILS NFR BLD AUTO: 0.3 % (ref 0–1.5)
BILIRUB SERPL-MCNC: 0.9 MG/DL (ref 0–1.2)
BLD GP AB SCN SERPL QL: POSITIVE
BUN SERPL-MCNC: 4 MG/DL (ref 6–20)
BUN/CREAT SERPL: 6.2 (ref 7–25)
CALCIUM SPEC-SCNC: 8.6 MG/DL (ref 8.6–10.5)
CHLORIDE SERPL-SCNC: 106 MMOL/L (ref 98–107)
CO2 SERPL-SCNC: 21.7 MMOL/L (ref 22–29)
CREAT SERPL-MCNC: 0.65 MG/DL (ref 0.57–1)
CREAT UR-MCNC: 61.7 MG/DL
DEPRECATED RDW RBC AUTO: 44.9 FL (ref 37–54)
EGFRCR SERPLBLD CKD-EPI 2021: 126.3 ML/MIN/1.73
EOSINOPHIL # BLD AUTO: 0.02 10*3/MM3 (ref 0–0.4)
EOSINOPHIL NFR BLD AUTO: 0.3 % (ref 0.3–6.2)
ERYTHROCYTE [DISTWIDTH] IN BLOOD BY AUTOMATED COUNT: 15.4 % (ref 12.3–15.4)
FETAL BLEED: NEGATIVE
GLOBULIN UR ELPH-MCNC: 3.1 GM/DL
GLUCOSE SERPL-MCNC: 76 MG/DL (ref 65–99)
HCT VFR BLD AUTO: 30.4 % (ref 34–46.6)
HGB BLD-MCNC: 9.8 G/DL (ref 12–15.9)
IMM GRANULOCYTES # BLD AUTO: 0.05 10*3/MM3 (ref 0–0.05)
IMM GRANULOCYTES NFR BLD AUTO: 0.7 % (ref 0–0.5)
LYMPHOCYTES # BLD AUTO: 0.82 10*3/MM3 (ref 0.7–3.1)
LYMPHOCYTES NFR BLD AUTO: 10.7 % (ref 19.6–45.3)
MCH RBC QN AUTO: 26.3 PG (ref 26.6–33)
MCHC RBC AUTO-ENTMCNC: 32.2 G/DL (ref 31.5–35.7)
MCV RBC AUTO: 81.5 FL (ref 79–97)
MONOCYTES # BLD AUTO: 0.41 10*3/MM3 (ref 0.1–0.9)
MONOCYTES NFR BLD AUTO: 5.4 % (ref 5–12)
NEUTROPHILS NFR BLD AUTO: 6.34 10*3/MM3 (ref 1.7–7)
NEUTROPHILS NFR BLD AUTO: 82.6 % (ref 42.7–76)
NRBC BLD AUTO-RTO: 0 /100 WBC (ref 0–0.2)
NUMBER OF DOSES: NORMAL
PLATELET # BLD AUTO: 259 10*3/MM3 (ref 140–450)
PMV BLD AUTO: 8.7 FL (ref 6–12)
POTASSIUM SERPL-SCNC: 3.4 MMOL/L (ref 3.5–5.2)
PROT ?TM UR-MCNC: 7.6 MG/DL
PROT SERPL-MCNC: 6.8 G/DL (ref 6–8.5)
PROT/CREAT UR: 123.2 MG/G CREA (ref 0–200)
RBC # BLD AUTO: 3.73 10*6/MM3 (ref 3.77–5.28)
RESIDUAL RHIG DETECTED: NORMAL
RH BLD: NEGATIVE
RH BLD: NEGATIVE
SODIUM SERPL-SCNC: 139 MMOL/L (ref 136–145)
T PALLIDUM IGG SER QL: NORMAL
T&S EXPIRATION DATE: NORMAL
WBC NRBC COR # BLD AUTO: 7.66 10*3/MM3 (ref 3.4–10.8)

## 2024-01-24 PROCEDURE — 88307 TISSUE EXAM BY PATHOLOGIST: CPT

## 2024-01-24 PROCEDURE — 25010000002 KETOROLAC TROMETHAMINE PER 15 MG: Performed by: NURSE ANESTHETIST, CERTIFIED REGISTERED

## 2024-01-24 PROCEDURE — 85461 HEMOGLOBIN FETAL: CPT | Performed by: OBSTETRICS & GYNECOLOGY

## 2024-01-24 PROCEDURE — 85025 COMPLETE CBC W/AUTO DIFF WBC: CPT | Performed by: OBSTETRICS & GYNECOLOGY

## 2024-01-24 PROCEDURE — 25010000002 ONDANSETRON PER 1 MG: Performed by: OBSTETRICS & GYNECOLOGY

## 2024-01-24 PROCEDURE — 86870 RBC ANTIBODY IDENTIFICATION: CPT | Performed by: OBSTETRICS & GYNECOLOGY

## 2024-01-24 PROCEDURE — 25010000002 FENTANYL CITRATE (PF) 50 MCG/ML SOLUTION: Performed by: STUDENT IN AN ORGANIZED HEALTH CARE EDUCATION/TRAINING PROGRAM

## 2024-01-24 PROCEDURE — 25010000002 DEXAMETHASONE SODIUM PHOSPHATE 20 MG/5ML SOLUTION: Performed by: NURSE ANESTHETIST, CERTIFIED REGISTERED

## 2024-01-24 PROCEDURE — 25810000003 LACTATED RINGERS PER 1000 ML: Performed by: NURSE ANESTHETIST, CERTIFIED REGISTERED

## 2024-01-24 PROCEDURE — 59515 CESAREAN DELIVERY: CPT | Performed by: OBSTETRICS & GYNECOLOGY

## 2024-01-24 PROCEDURE — 3E0234Z INTRODUCTION OF SERUM, TOXOID AND VACCINE INTO MUSCLE, PERCUTANEOUS APPROACH: ICD-10-PCS | Performed by: OBSTETRICS & GYNECOLOGY

## 2024-01-24 PROCEDURE — 25010000002 BUPIVACAINE PF 0.75 % SOLUTION: Performed by: STUDENT IN AN ORGANIZED HEALTH CARE EDUCATION/TRAINING PROGRAM

## 2024-01-24 PROCEDURE — 25810000003 LACTATED RINGERS PER 1000 ML: Performed by: OBSTETRICS & GYNECOLOGY

## 2024-01-24 PROCEDURE — 25010000002 KETOROLAC TROMETHAMINE PER 15 MG: Performed by: OBSTETRICS & GYNECOLOGY

## 2024-01-24 PROCEDURE — 86850 RBC ANTIBODY SCREEN: CPT | Performed by: OBSTETRICS & GYNECOLOGY

## 2024-01-24 PROCEDURE — 86900 BLOOD TYPING SEROLOGIC ABO: CPT | Performed by: OBSTETRICS & GYNECOLOGY

## 2024-01-24 PROCEDURE — 0UB70ZZ EXCISION OF BILATERAL FALLOPIAN TUBES, OPEN APPROACH: ICD-10-PCS | Performed by: OBSTETRICS & GYNECOLOGY

## 2024-01-24 PROCEDURE — 63710000001 DIPHENHYDRAMINE PER 50 MG: Performed by: OBSTETRICS & GYNECOLOGY

## 2024-01-24 PROCEDURE — 25010000002 CEFAZOLIN IN DEXTROSE 2-4 GM/100ML-% SOLUTION: Performed by: OBSTETRICS & GYNECOLOGY

## 2024-01-24 PROCEDURE — 86780 TREPONEMA PALLIDUM: CPT | Performed by: OBSTETRICS & GYNECOLOGY

## 2024-01-24 PROCEDURE — 25010000002 ONDANSETRON PER 1 MG: Performed by: NURSE ANESTHETIST, CERTIFIED REGISTERED

## 2024-01-24 PROCEDURE — 84156 ASSAY OF PROTEIN URINE: CPT | Performed by: OBSTETRICS & GYNECOLOGY

## 2024-01-24 PROCEDURE — 25010000002 MORPHINE PER 10 MG: Performed by: STUDENT IN AN ORGANIZED HEALTH CARE EDUCATION/TRAINING PROGRAM

## 2024-01-24 PROCEDURE — 25010000002 RHO D IMMUNE GLOBULIN 1500 UNIT/2ML SOLUTION PREFILLED SYRINGE: Performed by: OBSTETRICS & GYNECOLOGY

## 2024-01-24 PROCEDURE — 80053 COMPREHEN METABOLIC PANEL: CPT | Performed by: OBSTETRICS & GYNECOLOGY

## 2024-01-24 PROCEDURE — 86901 BLOOD TYPING SEROLOGIC RH(D): CPT | Performed by: OBSTETRICS & GYNECOLOGY

## 2024-01-24 PROCEDURE — 82570 ASSAY OF URINE CREATININE: CPT | Performed by: OBSTETRICS & GYNECOLOGY

## 2024-01-24 PROCEDURE — 58611 LIGATE OVIDUCT(S) ADD-ON: CPT | Performed by: OBSTETRICS & GYNECOLOGY

## 2024-01-24 PROCEDURE — 88302 TISSUE EXAM BY PATHOLOGIST: CPT | Performed by: OBSTETRICS & GYNECOLOGY

## 2024-01-24 RX ORDER — AMOXICILLIN 250 MG
1 CAPSULE ORAL 2 TIMES DAILY
Status: DISCONTINUED | OUTPATIENT
Start: 2024-01-24 | End: 2024-01-26 | Stop reason: HOSPADM

## 2024-01-24 RX ORDER — KETOROLAC TROMETHAMINE 15 MG/ML
15 INJECTION, SOLUTION INTRAMUSCULAR; INTRAVENOUS EVERY 6 HOURS
Status: DISPENSED | OUTPATIENT
Start: 2024-01-24 | End: 2024-01-25

## 2024-01-24 RX ORDER — FENTANYL CITRATE 50 UG/ML
INJECTION, SOLUTION INTRAMUSCULAR; INTRAVENOUS AS NEEDED
Status: DISCONTINUED | OUTPATIENT
Start: 2024-01-24 | End: 2024-01-24

## 2024-01-24 RX ORDER — ONDANSETRON 2 MG/ML
4 INJECTION INTRAMUSCULAR; INTRAVENOUS ONCE AS NEEDED
Status: COMPLETED | OUTPATIENT
Start: 2024-01-24 | End: 2024-01-24

## 2024-01-24 RX ORDER — METHYLERGONOVINE MALEATE 0.2 MG/ML
200 INJECTION INTRAVENOUS ONCE AS NEEDED
Status: DISCONTINUED | OUTPATIENT
Start: 2024-01-24 | End: 2024-01-24

## 2024-01-24 RX ORDER — SODIUM CHLORIDE, SODIUM LACTATE, POTASSIUM CHLORIDE, CALCIUM CHLORIDE 600; 310; 30; 20 MG/100ML; MG/100ML; MG/100ML; MG/100ML
125 INJECTION, SOLUTION INTRAVENOUS CONTINUOUS
Status: DISCONTINUED | OUTPATIENT
Start: 2024-01-24 | End: 2024-01-24

## 2024-01-24 RX ORDER — ONDANSETRON 4 MG/1
4 TABLET, ORALLY DISINTEGRATING ORAL EVERY 8 HOURS PRN
Status: DISCONTINUED | OUTPATIENT
Start: 2024-01-24 | End: 2024-01-26 | Stop reason: HOSPADM

## 2024-01-24 RX ORDER — CARBOPROST TROMETHAMINE 250 UG/ML
250 INJECTION, SOLUTION INTRAMUSCULAR
Status: DISCONTINUED | OUTPATIENT
Start: 2024-01-24 | End: 2024-01-24

## 2024-01-24 RX ORDER — SODIUM CHLORIDE 0.9 % (FLUSH) 0.9 %
10 SYRINGE (ML) INJECTION EVERY 12 HOURS SCHEDULED
Status: DISCONTINUED | OUTPATIENT
Start: 2024-01-24 | End: 2024-01-24

## 2024-01-24 RX ORDER — MORPHINE SULFATE 4 MG/ML
INJECTION, SOLUTION INTRAMUSCULAR; INTRAVENOUS
Status: COMPLETED | OUTPATIENT
Start: 2024-01-24 | End: 2024-01-24

## 2024-01-24 RX ORDER — PHENYLEPHRINE HCL IN 0.9% NACL 1 MG/10 ML
SYRINGE (ML) INTRAVENOUS AS NEEDED
Status: DISCONTINUED | OUTPATIENT
Start: 2024-01-24 | End: 2024-01-24 | Stop reason: SURG

## 2024-01-24 RX ORDER — ACETAMINOPHEN 500 MG
1000 TABLET ORAL ONCE
Status: COMPLETED | OUTPATIENT
Start: 2024-01-24 | End: 2024-01-24

## 2024-01-24 RX ORDER — FENTANYL CITRATE 50 UG/ML
INJECTION, SOLUTION INTRAMUSCULAR; INTRAVENOUS
Status: COMPLETED | OUTPATIENT
Start: 2024-01-24 | End: 2024-01-24

## 2024-01-24 RX ORDER — OXYCODONE HYDROCHLORIDE 10 MG/1
10 TABLET ORAL EVERY 4 HOURS PRN
Status: DISCONTINUED | OUTPATIENT
Start: 2024-01-24 | End: 2024-01-26 | Stop reason: HOSPADM

## 2024-01-24 RX ORDER — OXYTOCIN/0.9 % SODIUM CHLORIDE 30/500 ML
999 PLASTIC BAG, INJECTION (ML) INTRAVENOUS ONCE
Status: COMPLETED | OUTPATIENT
Start: 2024-01-24 | End: 2024-01-24

## 2024-01-24 RX ORDER — CEFAZOLIN SODIUM 2 G/100ML
2000 INJECTION, SOLUTION INTRAVENOUS ONCE
Status: COMPLETED | OUTPATIENT
Start: 2024-01-24 | End: 2024-01-24

## 2024-01-24 RX ORDER — SODIUM CHLORIDE 9 MG/ML
40 INJECTION, SOLUTION INTRAVENOUS AS NEEDED
Status: DISCONTINUED | OUTPATIENT
Start: 2024-01-24 | End: 2024-01-24

## 2024-01-24 RX ORDER — ACETAMINOPHEN 325 MG/1
650 TABLET ORAL EVERY 6 HOURS
Status: DISCONTINUED | OUTPATIENT
Start: 2024-01-25 | End: 2024-01-26 | Stop reason: HOSPADM

## 2024-01-24 RX ORDER — EPHEDRINE SULFATE 50 MG/ML
INJECTION INTRAVENOUS AS NEEDED
Status: DISCONTINUED | OUTPATIENT
Start: 2024-01-24 | End: 2024-01-24 | Stop reason: SURG

## 2024-01-24 RX ORDER — TRANEXAMIC ACID 10 MG/ML
1000 INJECTION, SOLUTION INTRAVENOUS ONCE AS NEEDED
Status: DISCONTINUED | OUTPATIENT
Start: 2024-01-24 | End: 2024-01-24

## 2024-01-24 RX ORDER — BUPIVACAINE HYDROCHLORIDE 7.5 MG/ML
INJECTION, SOLUTION EPIDURAL; RETROBULBAR
Status: COMPLETED | OUTPATIENT
Start: 2024-01-24 | End: 2024-01-24

## 2024-01-24 RX ORDER — OXYCODONE HYDROCHLORIDE 5 MG/1
5 TABLET ORAL EVERY 4 HOURS PRN
Status: DISCONTINUED | OUTPATIENT
Start: 2024-01-24 | End: 2024-01-26 | Stop reason: HOSPADM

## 2024-01-24 RX ORDER — FAMOTIDINE 10 MG/ML
20 INJECTION, SOLUTION INTRAVENOUS ONCE AS NEEDED
Status: COMPLETED | OUTPATIENT
Start: 2024-01-24 | End: 2024-01-24

## 2024-01-24 RX ORDER — LIDOCAINE HYDROCHLORIDE 10 MG/ML
0.5 INJECTION, SOLUTION INFILTRATION; PERINEURAL ONCE AS NEEDED
Status: DISCONTINUED | OUTPATIENT
Start: 2024-01-24 | End: 2024-01-24

## 2024-01-24 RX ORDER — OXYTOCIN/0.9 % SODIUM CHLORIDE 30/500 ML
125 PLASTIC BAG, INJECTION (ML) INTRAVENOUS CONTINUOUS PRN
Status: COMPLETED | OUTPATIENT
Start: 2024-01-24 | End: 2024-01-24

## 2024-01-24 RX ORDER — ONDANSETRON 2 MG/ML
INJECTION INTRAMUSCULAR; INTRAVENOUS AS NEEDED
Status: DISCONTINUED | OUTPATIENT
Start: 2024-01-24 | End: 2024-01-24 | Stop reason: SURG

## 2024-01-24 RX ORDER — ACETAMINOPHEN 500 MG
1000 TABLET ORAL EVERY 6 HOURS
Status: COMPLETED | OUTPATIENT
Start: 2024-01-24 | End: 2024-01-25

## 2024-01-24 RX ORDER — DEXAMETHASONE SODIUM PHOSPHATE 4 MG/ML
INJECTION, SOLUTION INTRA-ARTICULAR; INTRALESIONAL; INTRAMUSCULAR; INTRAVENOUS; SOFT TISSUE AS NEEDED
Status: DISCONTINUED | OUTPATIENT
Start: 2024-01-24 | End: 2024-01-24 | Stop reason: SURG

## 2024-01-24 RX ORDER — SIMETHICONE 80 MG
80 TABLET,CHEWABLE ORAL 4 TIMES DAILY PRN
Status: DISCONTINUED | OUTPATIENT
Start: 2024-01-24 | End: 2024-01-26 | Stop reason: HOSPADM

## 2024-01-24 RX ORDER — KETOROLAC TROMETHAMINE 30 MG/ML
30 INJECTION, SOLUTION INTRAMUSCULAR; INTRAVENOUS ONCE
Status: DISCONTINUED | OUTPATIENT
Start: 2024-01-24 | End: 2024-01-24

## 2024-01-24 RX ORDER — PROMETHAZINE HYDROCHLORIDE 12.5 MG/1
12.5 TABLET ORAL EVERY 4 HOURS PRN
Status: DISCONTINUED | OUTPATIENT
Start: 2024-01-24 | End: 2024-01-26 | Stop reason: HOSPADM

## 2024-01-24 RX ORDER — BUPIVACAINE HYDROCHLORIDE 7.5 MG/ML
INJECTION, SOLUTION EPIDURAL; RETROBULBAR AS NEEDED
Status: DISCONTINUED | OUTPATIENT
Start: 2024-01-24 | End: 2024-01-24

## 2024-01-24 RX ORDER — IBUPROFEN 600 MG/1
600 TABLET ORAL EVERY 6 HOURS
Status: DISCONTINUED | OUTPATIENT
Start: 2024-01-25 | End: 2024-01-25

## 2024-01-24 RX ORDER — ONDANSETRON 2 MG/ML
4 INJECTION INTRAMUSCULAR; INTRAVENOUS EVERY 6 HOURS PRN
Status: DISCONTINUED | OUTPATIENT
Start: 2024-01-24 | End: 2024-01-26 | Stop reason: HOSPADM

## 2024-01-24 RX ORDER — MISOPROSTOL 200 UG/1
800 TABLET ORAL ONCE AS NEEDED
Status: DISCONTINUED | OUTPATIENT
Start: 2024-01-24 | End: 2024-01-24

## 2024-01-24 RX ORDER — SODIUM CHLORIDE 0.9 % (FLUSH) 0.9 %
10 SYRINGE (ML) INJECTION AS NEEDED
Status: DISCONTINUED | OUTPATIENT
Start: 2024-01-24 | End: 2024-01-24

## 2024-01-24 RX ORDER — SODIUM CHLORIDE, SODIUM LACTATE, POTASSIUM CHLORIDE, CALCIUM CHLORIDE 600; 310; 30; 20 MG/100ML; MG/100ML; MG/100ML; MG/100ML
INJECTION, SOLUTION INTRAVENOUS CONTINUOUS PRN
Status: DISCONTINUED | OUTPATIENT
Start: 2024-01-24 | End: 2024-01-24 | Stop reason: SURG

## 2024-01-24 RX ORDER — CITRIC ACID/SODIUM CITRATE 334-500MG
30 SOLUTION, ORAL ORAL ONCE
Status: COMPLETED | OUTPATIENT
Start: 2024-01-24 | End: 2024-01-24

## 2024-01-24 RX ORDER — DIPHENHYDRAMINE HCL 25 MG
25 CAPSULE ORAL EVERY 6 HOURS PRN
Status: DISCONTINUED | OUTPATIENT
Start: 2024-01-24 | End: 2024-01-26 | Stop reason: HOSPADM

## 2024-01-24 RX ORDER — KETOROLAC TROMETHAMINE 30 MG/ML
INJECTION, SOLUTION INTRAMUSCULAR; INTRAVENOUS AS NEEDED
Status: DISCONTINUED | OUTPATIENT
Start: 2024-01-24 | End: 2024-01-24 | Stop reason: SURG

## 2024-01-24 RX ORDER — MORPHINE SULFATE 1 MG/ML
INJECTION, SOLUTION EPIDURAL; INTRATHECAL; INTRAVENOUS AS NEEDED
Status: DISCONTINUED | OUTPATIENT
Start: 2024-01-24 | End: 2024-01-24

## 2024-01-24 RX ORDER — PRENATAL VIT/IRON FUM/FOLIC AC 27MG-0.8MG
1 TABLET ORAL DAILY
Status: DISCONTINUED | OUTPATIENT
Start: 2024-01-24 | End: 2024-01-26 | Stop reason: HOSPADM

## 2024-01-24 RX ORDER — OXYTOCIN/0.9 % SODIUM CHLORIDE 30/500 ML
250 PLASTIC BAG, INJECTION (ML) INTRAVENOUS CONTINUOUS
Status: ACTIVE | OUTPATIENT
Start: 2024-01-24 | End: 2024-01-24

## 2024-01-24 RX ADMIN — DEXAMETHASONE SODIUM PHOSPHATE 4 MG: 4 INJECTION, SOLUTION INTRAMUSCULAR; INTRAVENOUS at 13:31

## 2024-01-24 RX ADMIN — Medication 100 MCG: at 13:40

## 2024-01-24 RX ADMIN — HUMAN RHO(D) IMMUNE GLOBULIN 1500 UNITS: 1500 SOLUTION INTRAMUSCULAR; INTRAVENOUS at 18:19

## 2024-01-24 RX ADMIN — SENNOSIDES AND DOCUSATE SODIUM 1 TABLET: 50; 8.6 TABLET ORAL at 21:29

## 2024-01-24 RX ADMIN — Medication 100 MCG: at 13:29

## 2024-01-24 RX ADMIN — EPHEDRINE SULFATE 5 MG: 50 INJECTION INTRAVENOUS at 13:53

## 2024-01-24 RX ADMIN — SODIUM CHLORIDE, POTASSIUM CHLORIDE, SODIUM LACTATE AND CALCIUM CHLORIDE: 600; 310; 30; 20 INJECTION, SOLUTION INTRAVENOUS at 14:05

## 2024-01-24 RX ADMIN — EPHEDRINE SULFATE 5 MG: 50 INJECTION INTRAVENOUS at 13:50

## 2024-01-24 RX ADMIN — DIPHENHYDRAMINE HYDROCHLORIDE 25 MG: 25 CAPSULE ORAL at 22:10

## 2024-01-24 RX ADMIN — FENTANYL CITRATE 15 MCG: 50 INJECTION, SOLUTION INTRAMUSCULAR; INTRAVENOUS at 13:25

## 2024-01-24 RX ADMIN — SODIUM CITRATE AND CITRIC ACID MONOHYDRATE 30 ML: 334; 500 SOLUTION ORAL at 12:43

## 2024-01-24 RX ADMIN — SODIUM CHLORIDE, POTASSIUM CHLORIDE, SODIUM LACTATE AND CALCIUM CHLORIDE 125 ML/HR: 600; 310; 30; 20 INJECTION, SOLUTION INTRAVENOUS at 13:08

## 2024-01-24 RX ADMIN — Medication 125 ML/HR: at 15:03

## 2024-01-24 RX ADMIN — ACETAMINOPHEN 1000 MG: 500 TABLET ORAL at 12:43

## 2024-01-24 RX ADMIN — ONDANSETRON HYDROCHLORIDE 4 MG: 2 INJECTION, SOLUTION INTRAMUSCULAR; INTRAVENOUS at 12:43

## 2024-01-24 RX ADMIN — FAMOTIDINE 20 MG: 10 INJECTION INTRAVENOUS at 12:43

## 2024-01-24 RX ADMIN — SODIUM CHLORIDE, POTASSIUM CHLORIDE, SODIUM LACTATE AND CALCIUM CHLORIDE: 600; 310; 30; 20 INJECTION, SOLUTION INTRAVENOUS at 13:15

## 2024-01-24 RX ADMIN — Medication 100 MCG: at 13:56

## 2024-01-24 RX ADMIN — KETOROLAC TROMETHAMINE 30 MG: 30 INJECTION, SOLUTION INTRAMUSCULAR; INTRAVENOUS at 14:12

## 2024-01-24 RX ADMIN — Medication 100 MCG: at 13:35

## 2024-01-24 RX ADMIN — Medication 999 ML/HR: at 13:45

## 2024-01-24 RX ADMIN — CEFAZOLIN SODIUM 2000 MG: 2 INJECTION, SOLUTION INTRAVENOUS at 13:09

## 2024-01-24 RX ADMIN — Medication 100 MCG: at 13:47

## 2024-01-24 RX ADMIN — Medication 100 MCG: at 14:23

## 2024-01-24 RX ADMIN — ONDANSETRON 4 MG: 2 INJECTION INTRAMUSCULAR; INTRAVENOUS at 13:31

## 2024-01-24 RX ADMIN — MORPHINE SULFATE 200 MCG: 4 INJECTION, SOLUTION INTRAMUSCULAR; INTRAVENOUS at 13:25

## 2024-01-24 RX ADMIN — Medication 100 MCG: at 13:26

## 2024-01-24 RX ADMIN — EPHEDRINE SULFATE 10 MG: 50 INJECTION INTRAVENOUS at 13:40

## 2024-01-24 RX ADMIN — KETOROLAC TROMETHAMINE 15 MG: 15 INJECTION, SOLUTION INTRAMUSCULAR; INTRAVENOUS at 21:29

## 2024-01-24 RX ADMIN — SODIUM CHLORIDE, POTASSIUM CHLORIDE, SODIUM LACTATE AND CALCIUM CHLORIDE 125 ML/HR: 600; 310; 30; 20 INJECTION, SOLUTION INTRAVENOUS at 12:00

## 2024-01-24 RX ADMIN — BUPIVACAINE HYDROCHLORIDE 1.6 ML: 7.5 INJECTION, SOLUTION EPIDURAL; RETROBULBAR at 13:25

## 2024-01-24 RX ADMIN — ACETAMINOPHEN 1000 MG: 500 TABLET ORAL at 19:03

## 2024-01-24 NOTE — OP NOTE
[facility]     2024    Patient:Madie Givens   MR#:[id number]     Section Procedure Note    Indications: oligohydramnios and previous  section low transverse    Pre-operative Diagnosis: Intrauterine pregnancy at 37w2d  2.  Oligohydramnios  3.  Previous  section  4.  Request for sterilization    Post-operative Diagnosis: same    Procedure:    Repeat low transverse  section   Bilateral salpingectomy    Surgeon: Skylar Singh MD     Assistants: Aurora Frey     Anesthesia: Spinal anesthesia    Prenatal care problem list:    Patient Active Problem List   Diagnosis    Previous  section    Rh negative status during pregnancy    Maternal anemia in pregnancy, antepartum    Maternal varicella, non-immune    Rubella non-immune status, antepartum    Pregnancy    COVID-19 affecting pregnancy in second trimester    Abnormal glucose tolerance test (GTT) during pregnancy, antepartum    Request for sterilization    Oligohydramnios in third trimester       Procedure Details   The patient was seen in the LDR preoperatively. The risks, benefits, complications, treatment options, and expected outcomes were discussed with the patient.  The patient concurred with the proposed plan, giving informed consent.  The site of surgery is discussed. The patient was taken to Operating Room # 1, identified as Madie Givens and the procedure verified as  Delivery. A Time Out was held and the above information confirmed.    After induction of anesthesia, the patient was draped and prepped in the usual sterile manner. A Pfannenstiel incision was made and carried down through the subcutaneous tissue to the fascia. Fascial incision was made and extended transversely. The fascia was  from the underlying rectus tissue superiorly and inferiorly. The peritoneum was identified and entered. Peritoneal incision was extended longitudinally.  The utero-vesical peritoneal reflection was incised transversely and the bladder flap was bluntly freed from the lower uterine segment. A low transverse uterine incision was made. Delivered from vertex presentation was a male  fetus 3200 g (7 lb 0.9 oz)  with Apgar scores of 8   at one minute and 8  at five minutes. After the umbilical cord was clamped and cut cord blood was obtained for evaluation. The placenta was removed intact and appeared normal. The uterine outline, tubes and ovaries appeared normal.  The uterine incision was closed with 2 layers running locked sutures of 0 Monocryl. Hemostasis was observed.    With the uterus exteriorized,  the tubes were identified and traced out to their fimbriated ends.  The Enseal device was used to clamp, cauterize and ligate the left and right tube approximately 3 cm from the cornual region.   The uterus was returned to the abdomen.  Hysterotomy was reinspected and noted to be hemostatic.     The peritoneum was then reapproximated with 3-0 Monocryl.  Fascia was then reapproximated with running sutures of 0 Vicryl. The subcutaneous layer was reapproximated with 3-0 Monocryl.  Skin was reapproximated with 4-0 monocryl in a subcuticular fashion.     Instrument, sponge, and needle counts were correct prior the abdominal closure and at the conclusion of the case.       Findings:  Normal uterus, tubes and ovaries    Quantatative Blood Loss:   See nursing flowsheet                   Specimens:  Placenta            Complications:  None; patient tolerated the procedure well.           Disposition: PACU - hemodynamically stable.           Condition: stable    Attending Attestation: I was present and scrubbed for the entire procedure.      Skylar Singh MD  2024 14:41 EST

## 2024-01-24 NOTE — ANESTHESIA PROCEDURE NOTES
Spinal Block      Patient reassessed immediately prior to procedure    Patient location during procedure: OR  Start Time: 1/24/2024 1:15 PM  Stop Time: 1/24/2024 12:35 PM  Indication:at surgeon's request  Performed By  CRNA/NOAH: Mari Maynard CRNA  Preanesthetic Checklist  Completed: patient identified, IV checked, site marked, risks and benefits discussed, surgical consent, monitors and equipment checked, pre-op evaluation and timeout performed  Spinal Block Prep:  Patient Position:sitting  Sterile Tech:cap, gloves and mask  Prep:Chloraprep  Patient Monitoring:blood pressure monitoring and continuous pulse oximetry    Spinal Block Procedure  Approach:midline  Guidance:landmark technique and palpation technique  Location:L4-L5  Needle Type:Calvin  Needle Gauge:25 G  Placement of Spinal needle event:cerebrospinal fluid aspirated  Paresthesia: transientMedications: Morphine sulfate (PF) injection - Spinal   200 mcg - 1/24/2024 1:25:00 PM  bupivacaine PF (MARCAINE) 0.75 % injection - Spinal   1.6 mL - 1/24/2024 1:25:00 PM  fentaNYL citrate (PF) (SUBLIMAZE) injection - Intrathecal   15 mcg - 1/24/2024 1:25:00 PM   Post Assessment  Patient Tolerance:patient tolerated the procedure well with no apparent complications

## 2024-01-24 NOTE — H&P
[facility]  Obstetric History and Physical    Chief Complaint   Patient presents with    Scheduled      Scheduled C/S for oligo. +FM, denies LOF or VB.       Subjective     Patient is a 23 y.o. female  currently at 37w2d, who presents for repeat  section today secondary to oligohydramnios.  MARIA M today on ultrasound was 5 cm with no 2 x 2 centimeter pocket.  Patient denies loss of fluid or contractions or vaginal bleeding.  She reports good fetal movement.    Her prenatal care is complicated by  Rubella Non-Immune, prior   desires repeat , and abnormal amniotic fluid  oligohydramnios.  Her previous obstetric/gynecological history is noted for is non-contributory.    The following portions of the patients history were reviewed and updated as appropriate: current medications, allergies, past medical history, past surgical history, past family history, past social history, and problem list .       Prenatal Information:  Prenatal Results       Initial Prenatal Labs       Test Value Reference Range Date Time    Hemoglobin  9.5 g/dL 11.1 - 15.9 23 1138       10.2 g/dL 12.0 - 15.9 23 1344       10.5 g/dL 11.1 - 15.9 23 1435    Hematocrit  30.0 % 34.0 - 46.6 23 1138       33.6 % 34.0 - 46.6 23 1344       34.6 % 34.0 - 46.6 23 1435    Platelets  250 x10E3/uL 150 - 450 23 1138       250 10*3/mm3 140 - 450 23 1344       277 x10E3/uL 150 - 450 23 1435    Rubella IgG  <0.90 index Immune >0.99 23 1138    Hepatitis B SAg  Negative  Negative 23 1138    Hepatitis C Ab  Non Reactive  Non Reactive 23 1138    RPR  Non Reactive  Non Reactive 23 1138    T. Pallidum Ab         ABO  O   23 1138    Rh  Negative   23 1138    Antibody Screen  Negative  Negative 23 1138    HIV  Non Reactive  Non Reactive 23 1138    Urine Culture  No growth   24 2228       Final report   23 1138     Gonorrhea  Negative  Negative 08/08/23 1154    Chlamydia  Negative  Negative 08/08/23 1154    TSH  2.690 uIU/mL 0.450 - 4.500 08/08/23 1138       1.970 uIU/mL 0.450 - 4.500 05/24/23 1435    HgB A1c   5.2 % 4.8 - 5.6 08/08/23 1138    Varicella IgG  <135 index Immune >165 08/08/23 1138    HgB Electrophoresis         Cystic fibrosis                   Fetal testing        Test Value Reference Range Date Time    NIPT        MSAFP        AFP-4                  2nd and 3rd Trimester       Test Value Reference Range Date Time    Hemoglobin (repeated)  9.8 g/dL 12.0 - 15.9 01/24/24 1201       8.9 g/dL 11.1 - 15.9 11/21/23 0954      ^ 8.4 g/dL 12.0 - 16.0 11/01/23 1344       8.5 g/dL 11.1 - 15.9 10/31/23 1007    Hematocrit (repeated)  30.4 % 34.0 - 46.6 01/24/24 1201       27.6 % 34.0 - 46.6 11/21/23 0954      ^ 27.1 % 36.0 - 46.0 11/01/23 1344       27.0 % 34.0 - 46.6 10/31/23 1007    Platelets   259 10*3/mm3 140 - 450 01/24/24 1201       261 x10E3/uL 150 - 450 11/21/23 0954      ^ 259 10*3/uL 140 - 440 11/01/23 1344       261 x10E3/uL 150 - 450 10/31/23 1007       250 x10E3/uL 150 - 450 08/08/23 1138       250 10*3/mm3 140 - 450 07/31/23 1344       277 x10E3/uL 150 - 450 05/24/23 1435    GCT  149 mg/dL 70 - 139 11/21/23 0954    Antibody Screen (repeated)  Negative  Negative 11/21/23 0954    Third Trimester syphilis screen (repeated)         GTT Fasting        GTT 1 Hr        GTT 2 Hr        GTT 3 Hr        Group B Strep  Negative  Negative 01/09/24 1213              Other testing        Test Value Reference Range Date Time    Parvo IgG         CMV IgG                   Drug Screening       Test Value Reference Range Date Time    Amphetamine Screen        Barbiturate Screen        Benzodiazepine Screen        Methadone Screen        Phencyclidine Screen        Opiates Screen        THC Screen        Cocaine Screen        Propoxyphene Screen        Buprenorphine Screen        Methamphetamine Screen        Oxycodone Screen         Tricyclic Antidepressants Screen                  Legend    ^: Historical                          External Prenatal Results       Pregnancy Outside Results - Transcribed From Office Records - See Scanned Records For Details       Test Value Date Time    ABO  O  08/08/23 1138    Rh  Negative  08/08/23 1138    Antibody Screen  Negative  11/21/23 0954       Negative  08/08/23 1138    Varicella IgG  <135 index 08/08/23 1138    Rubella  <0.90 index 08/08/23 1138    Hgb  9.8 g/dL 01/24/24 1201       8.9 g/dL 11/21/23 0954      ^ 8.4 g/dL 11/01/23 1344       8.5 g/dL 10/31/23 1007       9.5 g/dL 08/08/23 1138       10.2 g/dL 07/31/23 1344       10.5 g/dL 05/24/23 1435    Hct  30.4 % 01/24/24 1201       27.6 % 11/21/23 0954      ^ 27.1 % 11/01/23 1344       27.0 % 10/31/23 1007       30.0 % 08/08/23 1138       33.6 % 07/31/23 1344       34.6 % 05/24/23 1435    Glucose Fasting GTT       Glucose Tolerance Test 1 hour       Glucose Tolerance Test 3 hour       Gonorrhea (discrete)  Negative  08/08/23 1154    Chlamydia (discrete)  Negative  08/08/23 1154    RPR  Non Reactive  08/08/23 1138    VDRL       Syphilis Antibody       HBsAg  Negative  08/08/23 1138    Herpes Simplex Virus PCR       Herpes Simplex VIrus Culture       HIV  Non Reactive  08/08/23 1138    Hep C RNA Quant PCR       Hep C Antibody  Non Reactive  08/08/23 1138    AFP       Group B Strep  Negative  01/09/24 1213    GBS Susceptibility to Clindamycin       GBS Susceptibility to Erythromycin       Fetal Fibronectin       Genetic Testing, Maternal Blood                 Drug Screening       Test Value Date Time    Urine Drug Screen       Amphetamine Screen       Barbiturate Screen       Benzodiazepine Screen       Methadone Screen       Phencyclidine Screen       Opiates Screen       THC Screen       Cocaine Screen       Propoxyphene Screen       Buprenorphine Screen       Methamphetamine Screen       Oxycodone Screen       Tricyclic Antidepressants Screen                  Legend    ^: Historical                             Past OB History:     OB History    Para Term  AB Living   2 1 1 0 0 1   SAB IAB Ectopic Molar Multiple Live Births   0 0 0 0 0 1      # Outcome Date GA Lbr Galo/2nd Weight Sex Delivery Anes PTL Lv   2 Current            1 Term 21 39w3d  3335 g (7 lb 5.6 oz) F CS-LTranv Spinal N MAGAN      Birth Comments: Breech       Name: Pushpa       Apgar1: 8  Apgar5: 9      Obstetric Comments   23 - LMP rejected - IUP at 13-1 weeks - FAUSTINO 24 - F     10/3/23 - -0 weeks - Normal completed anatomy - Normal CL - ant placenta- [facility]       23 - 28-1 weeks - EFW 59%, AC 40% - [facility]     24 - 34-1 weeks - EFW 41% AC 30% - [facility]        Past Medical History: Past Medical History:   Diagnosis Date    Asthma     childhood    Hypothyroidism     Rh incompatibility       Past Surgical History Past Surgical History:   Procedure Laterality Date     SECTION N/A 2021    Procedure:  SECTION PRIMARY;  Surgeon: Skylar Singh MD;  Location: [facility] LABOR DELIVERY;  Service: Obstetrics/Gynecology;  Laterality: N/A;      Family History: Family History   Problem Relation Age of Onset    No Known Problems Father     No Known Problems Mother     Breast cancer Neg Hx     Ovarian cancer Neg Hx     Uterine cancer Neg Hx       Social History:  reports that she has never smoked. She has never been exposed to tobacco smoke. She has never used smokeless tobacco.   reports no history of alcohol use.   reports no history of drug use.        Review of Systems   Constitutional:  Negative for chills, fatigue and fever.   Gastrointestinal:  Negative for abdominal distention and abdominal pain.   Genitourinary:  Negative for dysuria, pelvic pain, vaginal bleeding, vaginal discharge and vaginal pain.   All other systems reviewed and are negative.        Objective     Vital Signs Range for the last 24 hours  Temperature: Temp:  [98.2 °F (36.8 °C)]  98.2 °F (36.8 °C)   Temp Source: Temp src: Oral   BP: BP: (114-127)/(55-72) 124/68   Pulse: Heart Rate:  [77-90] 81   Respirations: Resp:  [16] 16   SPO2: SpO2:  [100 %] 100 %   O2 Amount (l/min):     O2 Devices     Weight:       Physical Examination: General appearance - alert, well appearing, and in no distress  Abdomen - soft, nontender, nondistended, no masses or organomegaly  Musculoskeletal - no joint tenderness, deformity or swelling  Extremities - peripheral pulses normal, no pedal edema, no clubbing or cyanosis  Skin - normal coloration and turgor, no rashes, no suspicious skin lesions noted    Presentation: Vertex    Cervix: Exam by:     Dilation:     Effacement:     Station:       Fetal Heart Rate Assessment   Method:     Beats/min:     Baseline:     Variability:     Accels:     Decels:     Tracing Category:       Uterine Assessment   Method:     Frequency (min):     Ctx Count in 10 min:     Duration:     Intensity:     Intensity by IUPC:     Resting Tone:     Resting Tone by IUPC:     [place] Units:       Laboratory Results:   Results from last 7 days   Lab Units 24  1201   WBC 10*3/mm3 7.66   HEMOGLOBIN g/dL 9.8*   HEMATOCRIT % 30.4*   PLATELETS 10*3/mm3 259         Assessment & Plan       Previous  section      Assessment & Plan    Assessment:  1.  Intrauterine pregnancy at 37w2d gestation with reactive, reassuring fetal status.    2.  Oligohydramnios and prior  section  3.  Obstetrical history significant for is non-contributory.  4.  GBS status:   Strep Gp B Culture   Date Value Ref Range Status   2024 Negative Negative Final     Comment:     Centers for Disease Control and Prevention (CDC) and American Congress  of Obstetricians and Gynecologists (ACOG) guidelines for prevention of   group B streptococcal (GBS) disease specify co-collection of  a vaginal and rectal swab specimen to maximize sensitivity of GBS  detection. Per the CDC and ACOG, swabbing both the  lower vagina and  rectum substantially increases the yield of detection compared with  sampling the vagina alone.  Penicillin G, ampicillin, or cefazolin are indicated for intrapartum  prophylaxis of  GBS colonization. Reflex susceptibility  testing should be performed prior to use of clindamycin only on GBS  isolates from penicillin-allergic women who are considered a high risk  for anaphylaxis. Treatment with vancomycin without additional testing  is warranted if resistance to clindamycin is noted.         Plan:  1.  with Tubal scheduled  2. Plan of care has been reviewed with patient and  and nurse  3.  Risks, benefits of treatment plan have been discussed.  4.  All questions have been answered.        Skylar Singh MD  2024  12:30 EST

## 2024-01-24 NOTE — ANESTHESIA PREPROCEDURE EVALUATION
Anesthesia Evaluation     Patient summary reviewed and Nursing notes reviewed   no history of anesthetic complications:                Airway   Mallampati: II  TM distance: >3 FB  Neck ROM: full  Dental - normal exam     Pulmonary    (+) asthma,  Cardiovascular - negative cardio ROS        Neuro/Psych- negative ROS  GI/Hepatic/Renal/Endo    (+) obesity, thyroid problem hypothyroidism    Musculoskeletal     Abdominal    Substance History      OB/GYN    (+) Pregnant        Other                    Anesthesia Plan    ASA 2     spinal     (37w2d    I have reviewed the patient's history with the patient and the chart, including all pertinent laboratory results and imaging. Risks of neuraxial anesthesia were discussed with patient including but not limited to: inadequate block, bleeding, infection, persistent numbness or weakness, nerve damage, painful dysesthesia and spinal headache.  )    Anesthetic plan, risks, benefits, and alternatives have been provided, discussed and informed consent has been obtained with: patient.    CODE STATUS:    Level Of Support Discussed With: Patient  Code Status (Patient has no pulse and is not breathing): CPR (Attempt to Resuscitate)  Medical Interventions (Patient has pulse or is breathing): Full Support

## 2024-01-24 NOTE — OBED NOTES
[facility] Note OB        Patient Name: Madie Givens  YOB: 2000  MRN: [id number]  Admission Date: 2024 10:13 PM  Date of Service: 2024    Chief Complaint: Leaking Fluid (To [facility] with c/o feeling wetness on her panties when voiding)        Subjective     Madie Givens is a 23 y.o. female  at 37w1d with Estimated Date of Delivery: 24 who presents with the chief complaint listed above.  She felt wetness at 1800 today.  She sees Skylar Singh MD for her prenatal care. Her pregnancy has been complicated by:  Previous  section x 1, Rh negative.  Borderline MARIA M.    She describes fetal movement as normal.  She denies vaginal bleeding. She is not feeling contractions.          Objective   Patient Active Problem List    Diagnosis     *Previous  section [Z98.891]     MARIA M (amniotic fluid index) borderline low [O28.8]     Request for sterilization [Z30.2]     Abnormal glucose tolerance test (GTT) during pregnancy, antepartum [O99.810]     COVID-19 affecting pregnancy in second trimester [O98.512, U07.1]     Pregnancy [Z34.90]     Maternal anemia in pregnancy, antepartum [O99.019]     Maternal varicella, non-immune [O09.899, Z28.39]     Rubella non-immune status, antepartum [O09.899, Z28.39]     Rh negative status during pregnancy [O26.899, Z67.91]         OB History    Para Term  AB Living   2 1 1 0 0 1   SAB IAB Ectopic Molar Multiple Live Births   0 0 0 0 0 1      # Outcome Date GA Lbr Galo/2nd Weight Sex Delivery Anes PTL Lv   2 Current            1 Term 21 39w3d  3335 g (7 lb 5.6 oz) F CS-LTranv Spinal N MAGAN      Birth Comments: Breech       Name: Pushpa       Apgar1: 8  Apgar5: 9      Obstetric Comments   23 - LMP rejected - IUP at 13-1 weeks - FAUSTINO 24 - [facility]     10/3/23 - -0 weeks - Normal completed anatomy - Normal CL - ant placenta- [facility]       23 - 28-1 weeks - EFW 59%, AC 40% - [facility]     24 - 34-1 weeks - EFW 41% AC 30% -  "[facility]         Past Medical History:   Diagnosis Date    Asthma     childhood    Hypothyroidism     Rh incompatibility        Past Surgical History:   Procedure Laterality Date     SECTION N/A 2021    Procedure:  SECTION PRIMARY;  Surgeon: Skylar Singh MD;  Location: [facility] LABOR DELIVERY;  Service: Obstetrics/Gynecology;  Laterality: N/A;       No current facility-administered medications on file prior to encounter.     Current Outpatient Medications on File Prior to Encounter   Medication Sig Dispense Refill    Prenatal Vit-Fe Fumarate-FA (prenatal vitamin 27-0.8) 27-0.8 MG tablet tablet Take 1 tablet by mouth Daily.      aspirin 81 MG EC tablet Take 1 tablet by mouth Daily. 90 tablet 3       No Known Allergies    Family History   Problem Relation Age of Onset    No Known Problems Father     No Known Problems Mother     Breast cancer Neg Hx     Ovarian cancer Neg Hx     Uterine cancer Neg Hx        Social History     Socioeconomic History    Marital status: Single   Tobacco Use    Smoking status: Never     Passive exposure: Never    Smokeless tobacco: Never   Vaping Use    Vaping Use: Never used   Substance and Sexual Activity    Alcohol use: No    Drug use: No    Sexual activity: Yes     Partners: Male     Birth control/protection: None           Review of Systems   Constitutional: Negative.    Respiratory: Negative.     Cardiovascular: Negative.    Gastrointestinal: Negative.    Genitourinary:  Negative for vaginal bleeding.   Neurological: Negative.       \    PHYSICAL EXAM:      VITAL SIGNS:  Vitals:    24 2243   Height: 162.6 cm (64\")        FHT'S:                   Baseline:  150 BPM  Variability:  Moderate = 6 - 25 BPM  Accelerations:  15 x 15 accelerations present     Decelerations:  absent  Contractions:   present     Interpretation:    Reactive NST, CAT 1 tracing        PHYSICAL EXAM:    General: well developed; well nourished  no acute distress   Heart: Not performed. "   Lungs  : breathing is unlabored     Abdomen: soft, non-tender; no masses       Cervix: was checked (by RN): 0 cm / 50 % / -3  Cervical Dilation (cm): 0  Cervical Effacement: 50%  Cervical Consistency: medium  Cervical Position: posterior   Contractions: irritable        Extremities: peripheral pulses normal, no pedal edema, no clubbing or cyanosis      LABS AND TESTING ORDERED:  Uterine and fetal monitoring  Urinalysis  ROM +    LAB RESULTS:    Recent Results (from the past 24 hour(s))   POC Urinalysis Dipstick    Collection Time: 01/23/24 11:19 AM    Specimen: Urine   Result Value Ref Range    Glucose, UA Negative Negative mg/dL    Bilirubin Negative Negative    Ketones, UA Negative Negative    Specific Gravity  1.015 1.005 - 1.030    Blood, UA Negative Negative    pH, Urine 7.0 5.0 - 8.0    Protein, POC Negative Negative mg/dL    Urobilinogen, UA Normal Normal, 0.2 E.U./dL    Leukocytes Moderate (2+) (A) Negative    Nitrite, UA Negative Negative   Urinalysis With Culture If Indicated - Urine, Clean Catch    Collection Time: 01/23/24 10:28 PM    Specimen: Urine, Clean Catch   Result Value Ref Range    Color, UA Yellow Yellow, Straw    Appearance, UA Clear Clear    pH, UA 7.0 5.0 - 8.0    Specific Gravity, UA 1.009 1.005 - 1.030    Glucose, UA Negative Negative    Ketones, UA Negative Negative    Bilirubin, UA Negative Negative    Blood, UA Negative Negative    Protein, UA Negative Negative    Leuk Esterase, UA Trace (A) Negative    Nitrite, UA Negative Negative    Urobilinogen, UA 1.0 E.U./dL 0.2 - 1.0 E.U./dL   Urinalysis, Microscopic Only - Urine, Clean Catch    Collection Time: 01/23/24 10:28 PM    Specimen: Urine, Clean Catch   Result Value Ref Range    RBC, UA 0-2 None Seen, 0-2 /HPF    WBC, UA 3-5 (A) None Seen, 0-2 /HPF    Bacteria, UA None Seen None Seen /HPF    Squamous Epithelial Cells, UA 0-2 None Seen, 0-2 /HPF    Hyaline Casts, UA 0-2 None Seen /LPF    Methodology Automated Microscopy        Lab  "Results   Component Value Date    ABO O 2023    RH Negative 2023       Lab Results   Component Value Date    STREPGPB Negative 2024                 Assessment & Plan     ASSESSMENT/PLAN:  Madie Givens is a 23 y.o. female  at 37w1d who presented with: leaking fluid          Final Impression:  Pregnancy at 37w1d  Reactive NST.  CAT 1 tracing  ROM +  negative  Maternal vital signs were reviewed and were unremarkable              Vitals:    24 2243   Height: 162.6 cm (64\")       Lab Results   Component Value Date    STREPGPB Negative 2024     Lab Results   Component Value Date    ABO O 2023    RH Negative 2023         PLAN:     ROM +  negative  Patient discharged home with labor precautions.  She will keep her scheduled outpatient prenatal appointment in 1 week    I have spent 20 minutes including face to face time with the patient, greater than 50% in discussion of the diagnosis (counseling) and/or coordination of care.     Loulou Alejandro MD  2024  22:51 EST  OB Hospitalist  Phone:  [phone]  "

## 2024-01-25 LAB
BASOPHILS # BLD AUTO: 0.01 10*3/MM3 (ref 0–0.2)
BASOPHILS NFR BLD AUTO: 0.1 % (ref 0–1.5)
DEPRECATED RDW RBC AUTO: 46.8 FL (ref 37–54)
EOSINOPHIL # BLD AUTO: 0.02 10*3/MM3 (ref 0–0.4)
EOSINOPHIL NFR BLD AUTO: 0.2 % (ref 0.3–6.2)
ERYTHROCYTE [DISTWIDTH] IN BLOOD BY AUTOMATED COUNT: 15.4 % (ref 12.3–15.4)
HCT VFR BLD AUTO: 23.9 % (ref 34–46.6)
HGB BLD-MCNC: 7.7 G/DL (ref 12–15.9)
IMM GRANULOCYTES # BLD AUTO: 0.05 10*3/MM3 (ref 0–0.05)
IMM GRANULOCYTES NFR BLD AUTO: 0.6 % (ref 0–0.5)
LAB AP CASE REPORT: NORMAL
LYMPHOCYTES # BLD AUTO: 1.53 10*3/MM3 (ref 0.7–3.1)
LYMPHOCYTES NFR BLD AUTO: 18.1 % (ref 19.6–45.3)
MCH RBC QN AUTO: 27.3 PG (ref 26.6–33)
MCHC RBC AUTO-ENTMCNC: 32.2 G/DL (ref 31.5–35.7)
MCV RBC AUTO: 84.8 FL (ref 79–97)
MONOCYTES # BLD AUTO: 0.67 10*3/MM3 (ref 0.1–0.9)
MONOCYTES NFR BLD AUTO: 7.9 % (ref 5–12)
NEUTROPHILS NFR BLD AUTO: 6.16 10*3/MM3 (ref 1.7–7)
NEUTROPHILS NFR BLD AUTO: 73.1 % (ref 42.7–76)
NRBC BLD AUTO-RTO: 0.1 /100 WBC (ref 0–0.2)
PATH REPORT.FINAL DX SPEC: NORMAL
PATH REPORT.GROSS SPEC: NORMAL
PLATELET # BLD AUTO: 219 10*3/MM3 (ref 140–450)
PMV BLD AUTO: 9.3 FL (ref 6–12)
RBC # BLD AUTO: 2.82 10*6/MM3 (ref 3.77–5.28)
WBC NRBC COR # BLD AUTO: 8.44 10*3/MM3 (ref 3.4–10.8)

## 2024-01-25 PROCEDURE — 92650 AEP SCR AUDITORY POTENTIAL: CPT

## 2024-01-25 PROCEDURE — 25010000002 KETOROLAC TROMETHAMINE PER 15 MG: Performed by: OBSTETRICS & GYNECOLOGY

## 2024-01-25 PROCEDURE — 85025 COMPLETE CBC W/AUTO DIFF WBC: CPT | Performed by: OBSTETRICS & GYNECOLOGY

## 2024-01-25 PROCEDURE — 0503F POSTPARTUM CARE VISIT: CPT | Performed by: OBSTETRICS & GYNECOLOGY

## 2024-01-25 RX ORDER — IBUPROFEN 600 MG/1
600 TABLET ORAL EVERY 6 HOURS
Status: DISCONTINUED | OUTPATIENT
Start: 2024-01-25 | End: 2024-01-26 | Stop reason: HOSPADM

## 2024-01-25 RX ADMIN — ACETAMINOPHEN 1000 MG: 500 TABLET ORAL at 13:11

## 2024-01-25 RX ADMIN — IBUPROFEN 600 MG: 600 TABLET, FILM COATED ORAL at 18:10

## 2024-01-25 RX ADMIN — ACETAMINOPHEN 1000 MG: 500 TABLET ORAL at 00:29

## 2024-01-25 RX ADMIN — ACETAMINOPHEN 1000 MG: 500 TABLET ORAL at 06:52

## 2024-01-25 RX ADMIN — KETOROLAC TROMETHAMINE 15 MG: 15 INJECTION, SOLUTION INTRAMUSCULAR; INTRAVENOUS at 04:28

## 2024-01-25 RX ADMIN — SENNOSIDES AND DOCUSATE SODIUM 1 TABLET: 50; 8.6 TABLET ORAL at 21:22

## 2024-01-25 RX ADMIN — KETOROLAC TROMETHAMINE 15 MG: 15 INJECTION, SOLUTION INTRAMUSCULAR; INTRAVENOUS at 10:57

## 2024-01-25 RX ADMIN — SENNOSIDES AND DOCUSATE SODIUM 1 TABLET: 50; 8.6 TABLET ORAL at 08:57

## 2024-01-25 RX ADMIN — ACETAMINOPHEN 650 MG: 325 TABLET, FILM COATED ORAL at 21:22

## 2024-01-25 NOTE — LACTATION NOTE
This note was copied from a baby's chart.  P2, 37.2 weeks baby-24 hours old. Mom is 1-st time BF. She is planning on breast and formula feeding. Informed PT that LC is available to help with BF until 2300. Mother reports baby BF some, but has been sleepy.  Encouraged PT to try BF baby every 2-3 h. or PRN and always to offer breast first and then bottle.  Educated on the importance of stimulation for adequate milk supply. Instructions on how to rouse infant for feeding also given. Mom has PBP. Encouraged to call if needing help.

## 2024-01-25 NOTE — PROGRESS NOTES
" POSTPARTUM ROUNDS    Chief complaint: post C/S concerns  SUBJECTIVE:  Patient appears comfortable, and pain seems to be controlled with by mouth medicines.  She is ambulating. .  Discussed advancing activity and diet.       ROS:  Pulm: neg for soa  GI: neg for heavy bleeding  Musculoskel: neg for leg pain      OBJECTIVE:  Vital Signs: /61 (BP Location: Left arm, Patient Position: Lying)   Pulse 58   Temp 97.8 °F (36.6 °C) (Oral)   Resp 16   Ht 162.6 cm (64\")   Wt 87.5 kg (192 lb 12.8 oz)   LMP 2023 (Exact Date)   SpO2 99%   Breastfeeding Yes   BMI 33.09 kg/m²     Intake/Output Summary (Last 24 hours) at 2024 0935  Last data filed at 2024 0550  Gross per 24 hour   Intake 2516.42 ml   Output 2443 ml   Net 73.42 ml       Constitutional:  The patient is well nourished.  Cardiovascular:  RRR  Resp:  nonlabored breathing  The incision is normal  Gastrointestinal:  fundus firm below umbilicus  Extremities:  no edema,no evidence dvt    LABS / IMAGING:  Lab Results (last 24 hours)       Procedure Component Value Units Date/Time    CBC & Differential [[id number]]  (Abnormal) Collected: 24    Specimen: Blood Updated: 24    Narrative:      The following orders were created for panel order CBC & Differential.  Procedure                               Abnormality         Status                     ---------                               -----------         ------                     CBC Auto Differential[[id number]]        Abnormal            Final result                 Please view results for these tests on the individual orders.    CBC Auto Differential [[id number]]  (Abnormal) Collected: 24    Specimen: Blood Updated: 24     WBC 8.44 10*3/mm3      RBC 2.82 10*6/mm3      Hemoglobin 7.7 g/dL      Hematocrit 23.9 %      MCV 84.8 fL      MCH 27.3 pg      MCHC 32.2 g/dL      RDW 15.4 %      RDW-SD 46.8 fl      MPV 9.3 fL      Platelets 219 10*3/mm3     "  Neutrophil % 73.1 %      Lymphocyte % 18.1 %      Monocyte % 7.9 %      Eosinophil % 0.2 %      Basophil % 0.1 %      Immature Grans % 0.6 %      Neutrophils, Absolute 6.16 10*3/mm3      Lymphocytes, Absolute 1.53 10*3/mm3      Monocytes, Absolute 0.67 10*3/mm3      Eosinophils, Absolute 0.02 10*3/mm3      Basophils, Absolute 0.01 10*3/mm3      Immature Grans, Absolute 0.05 10*3/mm3      nRBC 0.1 /100 WBC     Tissue Pathology Exam [[id number]] Collected: 01/24/24 1357    Specimen: Tissue from Fallopian Tube, Right; Tissue from Fallopian Tube, Left Updated: 01/24/24 1531    Protein / Creatinine Ratio, Urine - Urine, Clean Catch [[id number]] Collected: 01/24/24 1201    Specimen: Urine, Clean Catch Updated: 01/24/24 1259     Protein/Creatinine Ratio, Urine 123.2 mg/G Crea      Creatinine, Urine 61.7 mg/dL      Total Protein, Urine 7.6 mg/dL     T Pallidum Antibody w/ reflex RPR [[id number]]  (Normal) Collected: 01/24/24 1201    Specimen: Blood Updated: 01/24/24 1241     Treponemal AB Total Non-Reactive    Comprehensive Metabolic Panel [[id number]]  (Abnormal) Collected: 01/24/24 1201    Specimen: Blood Updated: 01/24/24 1235     Glucose 76 mg/dL      BUN 4 mg/dL      Creatinine 0.65 mg/dL      Sodium 139 mmol/L      Potassium 3.4 mmol/L      Chloride 106 mmol/L      CO2 21.7 mmol/L      Calcium 8.6 mg/dL      Total Protein 6.8 g/dL      Albumin 3.7 g/dL      ALT (SGPT) 8 U/L      AST (SGOT) 11 U/L      Alkaline Phosphatase 159 U/L      Total Bilirubin 0.9 mg/dL      Globulin 3.1 gm/dL      A/G Ratio 1.2 g/dL      BUN/Creatinine Ratio 6.2     Anion Gap 11.3 mmol/L      eGFR 126.3 mL/min/1.73     Narrative:      GFR Normal >60  Chronic Kidney Disease <60  Kidney Failure <15      CBC & Differential [[id number]]  (Abnormal) Collected: 01/24/24 1201    Specimen: Blood Updated: 01/24/24 1218    Narrative:      The following orders were created for panel order CBC & Differential.  Procedure                                Abnormality         Status                     ---------                               -----------         ------                     CBC Auto Differential[[id number]]        Abnormal            Final result                 Please view results for these tests on the individual orders.    CBC Auto Differential [[id number]]  (Abnormal) Collected: 24 1201    Specimen: Blood Updated: 24 1218     WBC 7.66 10*3/mm3      RBC 3.73 10*6/mm3      Hemoglobin 9.8 g/dL      Hematocrit 30.4 %      MCV 81.5 fL      MCH 26.3 pg      MCHC 32.2 g/dL      RDW 15.4 %      RDW-SD 44.9 fl      MPV 8.7 fL      Platelets 259 10*3/mm3      Neutrophil % 82.6 %      Lymphocyte % 10.7 %      Monocyte % 5.4 %      Eosinophil % 0.3 %      Basophil % 0.3 %      Immature Grans % 0.7 %      Neutrophils, Absolute 6.34 10*3/mm3      Lymphocytes, Absolute 0.82 10*3/mm3      Monocytes, Absolute 0.41 10*3/mm3      Eosinophils, Absolute 0.02 10*3/mm3      Basophils, Absolute 0.02 10*3/mm3      Immature Grans, Absolute 0.05 10*3/mm3      nRBC 0.0 /100 WBC               OB History    Para Term  AB Living   2 2 2 0 0 2   SAB IAB Ectopic Molar Multiple Live Births   0 0 0 0 0 2      # Outcome Date GA Lbr Galo/2nd Weight Sex Delivery Anes PTL Lv   2 Term 24 37w2d  3200 g (7 lb 0.9 oz) M CS-LTranv Spinal N MAGAN      Birth Comments: OR 1 panda   1 Term 21 39w3d  3335 g (7 lb 5.6 oz) F CS-LTranv Spinal N MAGAN      Birth Comments: Breech       Obstetric Comments   23 - LMP rejected - IUP at 13-1 weeks - [facility] 24 - F     10/3/23 - -0 weeks - Normal completed anatomy - Normal CL - ant placenta- [facility]       23 - 28-1 weeks - EFW 59%, AC 40% - [facility]     24 - 34-1 weeks - EFW 41% AC 30% - [facility]           ASSESSMENT AND PLAN:    Principal Problem:    Previous  section  Active Problems:    Rh negative status during pregnancy    Maternal anemia in pregnancy, antepartum    Maternal varicella, non-immune    Rubella  non-immune status, antepartum    COVID-19 affecting pregnancy in second trimester    Request for sterilization    Oligohydramnios in third trimester         Patient is postop day 1 from [facility]  Patient is doing well  Advance diet as tolerated      Regular diet   Encourage ambulation     Zachariah Eli MD    1/25/2024  09:35 EST

## 2024-01-25 NOTE — ANESTHESIA POSTPROCEDURE EVALUATION
Patient: Madie Givens    Procedure Summary       Date: 24 Room / Location:  [facility]    Anesthesia Start: 1315 Anesthesia Stop: 144    Procedure:  SECTION REPEAT (Abdomen) Diagnosis:       Previous  section      (Previous  section [Z98.891])    Surgeons: Skylar Singh MD Provider: Zachariah Oliva MD    Anesthesia Type: spinal ASA Status: 2            Anesthesia Type: spinal    Vitals  Vitals Value Taken Time   BP 97/53 24   Temp 36.6 °C (97.9 °F) 24   Pulse 75 24   Resp 16 24   SpO2 99 % 24           Post Anesthesia Care and Evaluation    Post Neuraxial Block status: No signs or symptoms of PDPH

## 2024-01-25 NOTE — PROGRESS NOTES
"Continued Stay Note  [facility]     Patient Name: Madie Givens  MRN: [id number]  Today's Date: 1/25/2024    Admit Date: 1/24/2024    Plan: Infant may discharge to mother when medically ready. CSW will follow cord tox. NITO Aquino   Discharge Plan       Row Name 01/25/24 1104       Plan    Plan Infant may discharge to mother when medically ready. CSW will follow cord tox. NITO Aquino    Plan Comments Mother: Madie Givens, MRN: [id number]; infant: Tyra \"Ponce\" Tosha, MRN: [id number]. CSW was consulted for \"late prenatal care; follow cord tox\". CSW reviewed mother's chart and found that mother had an appointment to confirm pregnancy around 13 weeks, which is not late prenatal care per hospital policy. CSW spoke with mother's RN, and no concerns were noted. Of note, mother had no UDS collected prenatally or on admission, no UDS was collected for infant, and cord toxicology had already been sent. CSW does not believe mother would benefit from an assessment at this time. CSW will follow infant's cord toxicology, and complete mandated reporting to CPS if warranted. NITO Aquino                   Discharge Codes    No documentation.                       JESUS Pittman    "

## 2024-01-25 NOTE — PLAN OF CARE
Goal Outcome Evaluation:           Progress: improving  Outcome Evaluation: VSS. Pain adequately controlled via ERAs. Adequate urine output via gaines prior to d/c of gaines. Pt due to void at 0930. Pt is breastfeeding and decided to supplement if needed

## 2024-01-25 NOTE — PLAN OF CARE
Goal Outcome Evaluation:  Plan of Care Reviewed With: patient        Progress: improving  Outcome Evaluation: VS stable; Rates pain 0-1.  Scheduled meds effective.  Rubra light.  Hgb 7.7.  No c/o dizziness or lightheadedness.  Would like MMR and Varicella vaccines prior to discharge.  Vaccines ordered.

## 2024-01-26 VITALS
WEIGHT: 192.8 LBS | DIASTOLIC BLOOD PRESSURE: 66 MMHG | RESPIRATION RATE: 16 BRPM | HEART RATE: 60 BPM | TEMPERATURE: 97.8 F | SYSTOLIC BLOOD PRESSURE: 108 MMHG | HEIGHT: 64 IN | BODY MASS INDEX: 32.91 KG/M2 | OXYGEN SATURATION: 99 %

## 2024-01-26 PROCEDURE — 90471 IMMUNIZATION ADMIN: CPT | Performed by: OBSTETRICS & GYNECOLOGY

## 2024-01-26 PROCEDURE — 90707 MMR VACCINE SC: CPT | Performed by: OBSTETRICS & GYNECOLOGY

## 2024-01-26 PROCEDURE — 25010000002 MEASLES, MUMPS & RUBELLA VAC RECONSTITUTED SOLUTION: Performed by: OBSTETRICS & GYNECOLOGY

## 2024-01-26 PROCEDURE — 0503F POSTPARTUM CARE VISIT: CPT | Performed by: STUDENT IN AN ORGANIZED HEALTH CARE EDUCATION/TRAINING PROGRAM

## 2024-01-26 RX ORDER — OXYCODONE HYDROCHLORIDE 5 MG/1
5 TABLET ORAL EVERY 4 HOURS PRN
Qty: 15 TABLET | Refills: 0 | Status: SHIPPED | OUTPATIENT
Start: 2024-01-26

## 2024-01-26 RX ORDER — AMOXICILLIN 250 MG
1 CAPSULE ORAL DAILY
Qty: 60 TABLET | Refills: 0 | Status: SHIPPED | OUTPATIENT
Start: 2024-01-26

## 2024-01-26 RX ORDER — IBUPROFEN 800 MG/1
800 TABLET ORAL EVERY 8 HOURS PRN
Qty: 30 TABLET | Refills: 0 | Status: SHIPPED | OUTPATIENT
Start: 2024-01-26

## 2024-01-26 RX ORDER — ACETAMINOPHEN 500 MG
500 TABLET ORAL EVERY 6 HOURS PRN
Qty: 30 TABLET | Refills: 0 | Status: SHIPPED | OUTPATIENT
Start: 2024-01-26

## 2024-01-26 RX ADMIN — IBUPROFEN 600 MG: 600 TABLET, FILM COATED ORAL at 00:35

## 2024-01-26 RX ADMIN — IBUPROFEN 600 MG: 600 TABLET, FILM COATED ORAL at 06:43

## 2024-01-26 RX ADMIN — ACETAMINOPHEN 650 MG: 325 TABLET, FILM COATED ORAL at 10:51

## 2024-01-26 RX ADMIN — ACETAMINOPHEN 650 MG: 325 TABLET, FILM COATED ORAL at 04:46

## 2024-01-26 RX ADMIN — VARICELLA VIRUS VACCINE LIVE 0.5 ML: 1350 INJECTION, POWDER, LYOPHILIZED, FOR SUSPENSION SUBCUTANEOUS at 10:52

## 2024-01-26 RX ADMIN — MEASLES, MUMPS, AND RUBELLA VIRUS VACCINE LIVE 0.5 ML: 1000; 12500; 1000 INJECTION, POWDER, LYOPHILIZED, FOR SUSPENSION SUBCUTANEOUS at 10:52

## 2024-01-26 RX ADMIN — SENNOSIDES AND DOCUSATE SODIUM 1 TABLET: 50; 8.6 TABLET ORAL at 08:26

## 2024-01-26 RX ADMIN — Medication 1 TABLET: at 08:27

## 2024-01-26 NOTE — PLAN OF CARE
Goal Outcome Evaluation:           Progress: improving  Outcome Evaluation: VSS. Pain well controlled via ERAs. Scant bleeding. Pt states they would like to go home today 01/26 if possibe

## 2024-01-26 NOTE — DISCHARGE SUMMARY
[facility]  Delivery Discharge Summary    Primary OB Clinician: Skylar Singh MD    Admission Diagnosis:  Principal Problem:    Previous  section  Active Problems:    Rh negative status during pregnancy    Maternal anemia in pregnancy, antepartum    Maternal varicella, non-immune    Rubella non-immune status, antepartum    COVID-19 affecting pregnancy in second trimester    Request for sterilization    Oligohydramnios in third trimester      Discharge Diagnosis:  Same     Gestational Age: 37w2d    Date of Delivery: 2024     Delivery Type: , Low Transverse      Intrapartum Course: See delivery note for details.    Postpartum Course:  Uncomplicated pp course. Pt is tolerating po well, ambulating and voiding without difficulty.  Pain is well controlled. Bleeding is minimal/ appropriate for pp state.     Physical Exam:    Vitals:   Vitals:    24 0845 24 1520 24 2325 24 0747   BP:  106/67 102/64 108/66   BP Location:  Left arm Left arm Left arm   Patient Position:  Sitting Lying Lying   Pulse:  64 65 60   Resp:  16 16 16   Temp:  97.9 °F (36.6 °C) 98 °F (36.7 °C) 97.8 °F (36.6 °C)   TempSrc:  Oral Oral Oral   SpO2: 99%      Weight:       Height:         Temp (24hrs), Av.9 °F (36.6 °C), Min:97.8 °F (36.6 °C), Max:98 °F (36.7 °C)      General Appearance:    Alert, cooperative, in no acute distress   Abdomen:    Fundus firm below umbilicus, nontender, benign non-tender, without masses or organomegaly palpable, incision clean, dry, and intact, without erythema, and without evidence of infection           Extremities: Moves all extremities well, 1+ edema , no cyanosis, no redness.     Labs:   Results from last 7 days   Lab Units 24  0740 24  1201   WBC 10*3/mm3 8.44 7.66   HEMOGLOBIN g/dL 7.7* 9.8*   HEMATOCRIT % 23.9* 30.4*   PLATELETS 10*3/mm3 219 259     Results from last 7 days   Lab Units 24  1201   GLUCOSE mg/dL 76         Discharge  Medications:     Discharge Medications        New Medications        Instructions Start Date   acetaminophen 500 MG tablet  Commonly known as: TYLENOL   500 mg, Oral, Every 6 Hours PRN      ibuprofen 800 MG tablet  Commonly known as: ADVIL,MOTRIN   800 mg, Oral, Every 8 Hours PRN      oxyCODONE 5 MG immediate release tablet  Commonly known as: Roxicodone   5 mg, Oral, Every 4 Hours PRN      sennosides-docusate 8.6-50 MG per tablet  Commonly known as: PERICOLACE   1 tablet, Oral, Daily             Continue These Medications        Instructions Start Date   ferrous sulfate 325 (65 FE) MG tablet   325 mg, Oral, Daily With Breakfast      prenatal vitamin 27-0.8 27-0.8 MG tablet tablet   1 tablet, Oral, Daily             Stop These Medications      aspirin 81 MG EC tablet              Feeding method: Breastfeeding Status: Yes    Blood Type: RH Negative Rhogam Given       Plan:  Discharge to home.    Continue PO iron supplementation.   Follow-up appointment with  Dr. Skylar Singh  in 2 week.  All discharge instructions were reviewed with pt including bleeding warnings, s/sx of pp depression, and warning signs in the pp period for which to seek medical attention including but not limited to s/sx of hypertension and thromboembolism.

## 2024-01-30 NOTE — PROGRESS NOTES
"Continued Stay Note  [facility]     Patient Name: Madie Givens  MRN: [id number]  Today's Date: 1/30/2024    Admit Date: 1/24/2024    Plan: Infant may discharge to mother when medically ready. CSW will follow cord tox. NITO Aquino   Discharge Plan       Row Name 01/30/24 1401       Plan    Plan Comments Mother: Madie Givens, MRN: [id number]; infant: Tyra \"Catarino\" Tosha, MRN: [id number]. CSW has reviewed infant's cord toxicology results and infant's cord was negative for substances. Mandated CPS reporting is not required at this time. NITO Loredo.                   Discharge Codes    No documentation.                 Expected Discharge Date and Time       Expected Discharge Date Expected Discharge Time    Jan 26, 2024               JESUS Sanchez    "

## 2024-02-06 ENCOUNTER — POSTPARTUM VISIT (OUTPATIENT)
Dept: OBSTETRICS AND GYNECOLOGY | Age: 24
End: 2024-02-06
Payer: COMMERCIAL

## 2024-02-06 VITALS
SYSTOLIC BLOOD PRESSURE: 110 MMHG | WEIGHT: 178 LBS | BODY MASS INDEX: 30.39 KG/M2 | DIASTOLIC BLOOD PRESSURE: 68 MMHG | HEIGHT: 64 IN

## 2024-02-06 DIAGNOSIS — Z98.891 S/P CESAREAN SECTION: ICD-10-CM

## 2024-02-06 PROBLEM — O41.03X0 OLIGOHYDRAMNIOS IN THIRD TRIMESTER: Status: RESOLVED | Noted: 2024-01-24 | Resolved: 2024-02-06

## 2024-02-06 PROBLEM — Z28.39 MATERNAL VARICELLA, NON-IMMUNE: Status: RESOLVED | Noted: 2023-08-10 | Resolved: 2024-02-06

## 2024-02-06 PROBLEM — Z28.39 RUBELLA NON-IMMUNE STATUS, ANTEPARTUM: Status: RESOLVED | Noted: 2023-08-10 | Resolved: 2024-02-06

## 2024-02-06 PROBLEM — O09.899 RUBELLA NON-IMMUNE STATUS, ANTEPARTUM: Status: RESOLVED | Noted: 2023-08-10 | Resolved: 2024-02-06

## 2024-02-06 PROBLEM — O99.019 MATERNAL ANEMIA IN PREGNANCY, ANTEPARTUM: Status: RESOLVED | Noted: 2023-08-10 | Resolved: 2024-02-06

## 2024-02-06 PROBLEM — U07.1 COVID-19 AFFECTING PREGNANCY IN SECOND TRIMESTER: Status: RESOLVED | Noted: 2023-10-31 | Resolved: 2024-02-06

## 2024-02-06 PROBLEM — Z90.79 HISTORY OF SALPINGECTOMY: Status: ACTIVE | Noted: 2024-02-06

## 2024-02-06 PROBLEM — Z67.91 RH NEGATIVE STATUS DURING PREGNANCY: Status: RESOLVED | Noted: 2023-08-08 | Resolved: 2024-02-06

## 2024-02-06 PROBLEM — Z34.90 PREGNANCY: Status: RESOLVED | Noted: 2023-09-05 | Resolved: 2024-02-06

## 2024-02-06 PROBLEM — O09.899 MATERNAL VARICELLA, NON-IMMUNE: Status: RESOLVED | Noted: 2023-08-10 | Resolved: 2024-02-06

## 2024-02-06 PROBLEM — O26.899 RH NEGATIVE STATUS DURING PREGNANCY: Status: RESOLVED | Noted: 2023-08-08 | Resolved: 2024-02-06

## 2024-02-06 PROBLEM — O99.810 ABNORMAL GLUCOSE TOLERANCE TEST (GTT) DURING PREGNANCY, ANTEPARTUM: Status: RESOLVED | Noted: 2023-11-22 | Resolved: 2024-02-06

## 2024-02-06 PROBLEM — O98.512 COVID-19 AFFECTING PREGNANCY IN SECOND TRIMESTER: Status: RESOLVED | Noted: 2023-10-31 | Resolved: 2024-02-06

## 2024-02-06 PROBLEM — Z30.2 REQUEST FOR STERILIZATION: Status: RESOLVED | Noted: 2024-01-02 | Resolved: 2024-02-06

## 2024-02-06 NOTE — PROGRESS NOTES
"Subjective   Madie Givens is a 23 y.o. female who presents for a postpartum visit. She is 2 weeks postpartum following a low cervical transverse  section. I have fully reviewed the prenatal and intrapartum course. The delivery was at 37 gestational weeks. Outcome: repeat  section, low transverse incision. Anesthesia: spinal. Postpartum course has been uncomplicated. Baby's course has been uncomplicated. Baby is feeding by breast. Bleeding thin lochia. Bowel function is normal. Bladder function is normal. Patient is not sexually active. Contraception method is tubal ligation. Postpartum depression screening: negative.    The following portions of the patient's history were reviewed and updated as appropriate: allergies, current medications, past family history, past medical history, past social history, past surgical history, and problem list.    Review of Systems  Pertinent items are noted in HPI.    Objective   /68   Ht 162.6 cm (64\")   Wt 80.7 kg (178 lb)   LMP 2023 (Exact Date)   Breastfeeding Yes   BMI 30.55 kg/m²    General:  alert, appears stated age, and cooperative   Abdomen: soft, non-tender; bowel sounds normal; no masses,  no organomegaly and Inc C/D/I      Assessment & Plan   postpartum exam. Pap smear not done at today's visit.    1. Contraception: tubal ligation  2. Follow up in: 4 weeks or as needed.  Counseling was given to patient for the following topics: instructions for management and importance of treatment compliance . Total time of the encounter was 20 minutes and 15 minutes was spent counseling.             "

## 2024-03-04 ENCOUNTER — TELEPHONE (OUTPATIENT)
Dept: OBSTETRICS AND GYNECOLOGY | Age: 24
End: 2024-03-04
Payer: COMMERCIAL

## 2024-03-04 NOTE — TELEPHONE ENCOUNTER
Regarding: About my leave   Contact: [phone]  ----- Message from Tricia Clements sent at 3/4/2024 10:37 AM EST -----       ----- Message from Madie Givens to Skylar Singh MD sent at 3/4/2024 10:21 AM -----   Tiago Agarwal, this isn’t really a medical question, but I was just informed from my employer that I will no longer be getting paid on leave, that being said I cannot afford to be off for that long amount of time with no pay as I still have bills and kids to care for. So I was sending this message to ask you if there was anyway I could be released to go back to work before as soon as possible. I know I had an appointment for march 5th but it’s been moved to the 12th that’s why I was messaging to see.

## 2024-03-04 NOTE — TELEPHONE ENCOUNTER
6 wks pp today , csection on 1/24/24 , next upcoming visit 3/12/24 , she works at nursing home . I left her a message to see what  exact day she wants to go back to work .

## 2024-03-19 ENCOUNTER — POSTPARTUM VISIT (OUTPATIENT)
Dept: OBSTETRICS AND GYNECOLOGY | Age: 24
End: 2024-03-19
Payer: COMMERCIAL

## 2024-03-19 VITALS
HEIGHT: 64 IN | DIASTOLIC BLOOD PRESSURE: 64 MMHG | BODY MASS INDEX: 31.92 KG/M2 | SYSTOLIC BLOOD PRESSURE: 100 MMHG | WEIGHT: 187 LBS

## 2024-03-19 NOTE — PROGRESS NOTES
"Subjective   Madie Givens is a 23 y.o. female who presents for a postpartum visit. She is 10 weeks postpartum following a low cervical transverse  section. I have fully reviewed the prenatal and intrapartum course. The delivery was at 37-2 gestational weeks. Outcome: repeat  section, low transverse incision. Anesthesia: spinal. Postpartum course has been uncomplicated. Baby's course has been uncomplicated. Baby is feeding by bottle. Bleeding no bleeding. Bowel function is normal. Bladder function is normal. Patient is not sexually active. Contraception method is tubal ligation. Postpartum depression screening: negative.    The following portions of the patient's history were reviewed and updated as appropriate: allergies, current medications, past family history, past medical history, past social history, past surgical history, and problem list.    Review of Systems  Pertinent items are noted in HPI.    Objective   /64   Ht 162.6 cm (64\")   Wt 84.8 kg (187 lb)   LMP 2024 (Approximate)   Breastfeeding No   BMI 32.10 kg/m²    General:  alert, appears stated age, and cooperative   Abdomen: soft, non-tender; bowel sounds normal; no masses,  no organomegaly and Inc healed well      Assessment & Plan    postpartum exam. Pap smear not done at today's visit.    1. Contraception: tubal ligation  2. Follow up in: 6 months or as needed.  Counseling was given to patient for the following topics: instructions for management, impressions, and importance of treatment compliance . Total time of the encounter was 20 minutes and 15 minutes was spent counseling.           "

## 2024-07-13 ENCOUNTER — HOSPITAL ENCOUNTER (EMERGENCY)
Facility: HOSPITAL | Age: 24
Discharge: HOME | End: 2024-07-13
Attending: EMERGENCY MEDICINE
Payer: OTHER MISCELLANEOUS

## 2024-07-13 VITALS
HEIGHT: 63 IN | TEMPERATURE: 97.7 F | WEIGHT: 190 LBS | SYSTOLIC BLOOD PRESSURE: 129 MMHG | BODY MASS INDEX: 33.66 KG/M2 | OXYGEN SATURATION: 100 % | DIASTOLIC BLOOD PRESSURE: 78 MMHG | HEART RATE: 63 BPM | RESPIRATION RATE: 16 BRPM

## 2024-07-13 DIAGNOSIS — S46.912A MUSCLE STRAIN OF LEFT SCAPULAR REGION, INITIAL ENCOUNTER: Primary | ICD-10-CM

## 2024-07-13 PROCEDURE — 99282 EMERGENCY DEPT VISIT SF MDM: CPT

## 2024-07-13 RX ORDER — DICLOFENAC SODIUM 75 MG/1
75 TABLET, DELAYED RELEASE ORAL 2 TIMES DAILY PRN
Qty: 20 TABLET | Refills: 0 | Status: SHIPPED | OUTPATIENT
Start: 2024-07-13

## 2024-07-13 RX ORDER — CYCLOBENZAPRINE HCL 10 MG
10 TABLET ORAL 3 TIMES DAILY PRN
Qty: 24 TABLET | Refills: 0 | Status: SHIPPED | OUTPATIENT
Start: 2024-07-13

## 2024-07-13 NOTE — ED PROVIDER NOTES
"Subjective     History provided by:  Patient    History of Present Illness    Chief complaint: \"Shoulder pain\"    Location: Scapular area of the left shoulder.    Quality/Severity: The patient has pain in her left scapular area.    Timing/Onset: Started last night about 7 PM.    Modifying Factors: Movement and lifting with the left shoulder exacerbates pain.    Associated symptoms: No anterior shoulder pain.    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  She got off her shift yesterday evening and noticed she had pain in her left scapular area of her left shoulder.  The pain is more pronounced today.  Using her left shoulder exacerbates the pain.  She has no previous history of left shoulder problems.  Her past medical history is negative.  Last menstrual period was a week ago and she is status post bilateral tubal ligation.    Review of Systems  Past Medical History:   Diagnosis Date    Asthma     childhood    Hypothyroidism     Rh incompatibility      /78 (BP Location: Right arm, Patient Position: Sitting)   Pulse 63   Temp 97.7 °F (36.5 °C) (Oral)   Resp 16   Ht 160 cm (63\")   Wt 86.2 kg (190 lb)   SpO2 100%   BMI 33.66 kg/m²     Past Medical History:   Diagnosis Date    Asthma     childhood    Hypothyroidism     Rh incompatibility        No Known Allergies    Past Surgical History:   Procedure Laterality Date     SECTION N/A 2021    Procedure:  SECTION PRIMARY;  Surgeon: Skylar Singh MD;  Location: [facility] LABOR DELIVERY;  Service: Obstetrics/Gynecology;  Laterality: N/A;     SECTION N/A 2024    Procedure:  SECTION REPEAT;  Surgeon: Skylar Singh MD;  Location: [facility] LABOR DELIVERY;  Service: Obstetrics/Gynecology;  Laterality: N/A;       Family History   Problem Relation Age of Onset    No Known Problems Father     No Known Problems Mother     Breast cancer Neg Hx     Ovarian " cancer Neg Hx     Uterine cancer Neg Hx        Social History     Socioeconomic History    Marital status: Single   Tobacco Use    Smoking status: Never     Passive exposure: Never    Smokeless tobacco: Never   Vaping Use    Vaping status: Never Used   Substance and Sexual Activity    Alcohol use: No    Drug use: No    Sexual activity: Yes     Partners: Male     Birth control/protection: None           Objective   Physical Exam  Vitals and nursing note reviewed.   Constitutional:       General: She is not in acute distress.     Appearance: Normal appearance. She is well-developed. She is not ill-appearing, toxic-appearing or diaphoretic.      Comments: The patient appears healthy in no acute distress.  Review of her vital signs: She is afebrile and all her vital signs are within normal limits.   HENT:      Head: Normocephalic and atraumatic.      Nose: Nose normal.      Mouth/Throat:      Mouth: Mucous membranes are moist.      Pharynx: Oropharynx is clear.   Eyes:      General: No scleral icterus.        Right eye: No discharge.         Left eye: No discharge.      Conjunctiva/sclera: Conjunctivae normal.      Pupils: Pupils are equal, round, and reactive to light.   Neck:      Thyroid: No thyromegaly.      Vascular: No JVD.   Musculoskeletal:      Cervical back: Normal range of motion and neck supple.      Comments: There is no bony deformity of the left shoulder.  She has limited abduction due to discomfort in her left scapular area.  Good rotation the left shoulder.  The left upper extremity is without deformity, without swelling, and is neurovascular intact.   Lymphadenopathy:      Cervical: No cervical adenopathy.   Skin:     General: Skin is warm and dry.      Capillary Refill: Capillary refill takes less than 2 seconds.      Coloration: Skin is not jaundiced or pale.      Findings: No erythema or rash.   Neurological:      Mental Status: She is alert and oriented to person, place, and time.      Cranial  Nerves: No cranial nerve deficit.      Coordination: Coordination normal.      Comments: No focal motor sensory deficit   Psychiatric:         Mood and Affect: Mood normal.         Behavior: Behavior normal.         Thought Content: Thought content normal.         Judgment: Judgment normal.         Procedures           ED Course  ED Course as of 07/13/24 1440   Sat Jul 13, 2024   1438 Is my impression the patient has a strain of her left scapular area muscles.  She will be prescribed Flexeril and Voltaren.  She will be instructed no lifting with her left arm until she is released by [facility]. [TP]      ED Course User Index  [TP] Lukas Trejo MD                                             Medical Decision Making  My differential includes but is not limited to shoulder contusion, clavicle fracture, scapular fracture, humeral head, neck or shaft fracture, AC separation, shoulder dislocation, shoulder sprain, axillary nerve palsy and myocardial infarction     Problems Addressed:  Muscle strain of left scapular region, initial encounter: complicated acute illness or injury    Risk  Prescription drug management.        Final diagnoses:   Muscle strain of left scapular region, initial encounter       ED Disposition  ED Disposition       ED Disposition   Discharge    Condition   Stable    Comment   --               [facility] OCCUPATIONAL MEDICINE  [address] [place] [id number] [phone]  Schedule an appointment as soon as possible for a visit in 1 week           Medication List        New Prescriptions      cyclobenzaprine 10 MG tablet  Commonly known as: FLEXERIL  Take 1 tablet by mouth 3 (Three) Times a Day As Needed for Muscle Spasms for up to 24 doses.     diclofenac 75 MG EC tablet  Commonly known as: VOLTAREN  Take 1 tablet by mouth 2 (Two) Times a Day As Needed (Pain) for up to 20 doses.            Stop      methylPREDNISolone 4 MG dose pack  Commonly known as: MEDROL      promethazine-dextromethorphan 6.25-15 MG/5ML syrup  Commonly known as: PROMETHAZINE-DM               Where to Get Your Medications        These medications were sent to [facility]/pharmacy #[id number] - [place], [place] - [address] - [phone] [facility] [phone] [address] [address]      Phone: [phone]   cyclobenzaprine 10 MG tablet  diclofenac 75 MG EC tablet           Labs Reviewed - No data to display  No orders to display          Medication List        New Prescriptions      cyclobenzaprine 10 MG tablet  Commonly known as: FLEXERIL  Take 1 tablet by mouth 3 (Three) Times a Day As Needed for Muscle Spasms for up to 24 doses.     diclofenac 75 MG EC tablet  Commonly known as: VOLTAREN  Take 1 tablet by mouth 2 (Two) Times a Day As Needed (Pain) for up to 20 doses.            Stop      methylPREDNISolone 4 MG dose pack  Commonly known as: MEDROL     promethazine-dextromethorphan 6.25-15 MG/5ML syrup  Commonly known as: PROMETHAZINE-DM               Where to Get Your Medications        These medications were sent to [facility]/pharmacy #[id number] - [place] - [address] - [phone]  - [phone] [address] [address]      Phone: [phone]   cyclobenzaprine 10 MG tablet  diclofenac 75 MG EC tablet              Lukas Trejo MD  07/13/24 4709

## 2024-07-13 NOTE — Clinical Note
TOMMY [facility]AILON  [facility] EMERGENCY DEPARTMENT  [address] [place] [address]  Phone: [phone]    Madie Givens was seen and treated in our emergency department on 7/13/2024.  She may return to work on 07/13/2024.  No lifting with left hand/arm until released by [facility].       Thank you for choosing [facility].    Lukas Trejo MD

## 2025-05-23 ENCOUNTER — PATIENT ROUNDING (BHMG ONLY) (OUTPATIENT)
Dept: URGENT CARE | Facility: CLINIC | Age: 25
End: 2025-05-23
Payer: COMMERCIAL

## 2025-05-23 NOTE — ED NOTES
Thank you for letting us care for you in your recent visit to our [facility]. We would love to hear about your experience with us. We hope you had a great visit.     We’re always looking for ways to make our patients’ experiences even better. Do you have any recommendations on ways we may improve?     I appreciate you taking the time to respond. Please be on the lookout for a survey about your recent visit from [employer] via text or email. We would greatly appreciate if you could fill that out and turn it back in. We want your voice to be heard and we value your feedback.   Thank you for choosing [facility] for your healthcare needs.     If you have concerns or would like to speak to me in person regarding your visit please feel free to give me a call. [phone].    Hope you get well soon and thank you.    Cy Landrum LPN Practice Manager

## (undated) DEVICE — SUT VIC 4/0 PS2 18IN VCP496H

## (undated) DEVICE — Device

## (undated) DEVICE — SUT MNCRYL 3/0 CT1 36 IN Y944H

## (undated) DEVICE — SUT MNCRYL 0/0 CTX 36IN Y398H

## (undated) DEVICE — ANTIBACTERIAL UNDYED BRAIDED (POLYGLACTIN 910), SYNTHETIC ABSORBABLE SUTURE: Brand: COATED VICRYL

## (undated) DEVICE — GLV SURG BIOGEL LTX PF 6 1/2

## (undated) DEVICE — 3M(TM) TEGADERM(TM) TRANSPARENT FILM DRESSING FRAME STYLE 1627: Brand: 3M™ TEGADERM™

## (undated) DEVICE — SUT MNCRYL PLS ANTIB UD 4/0 PS2 18IN

## (undated) DEVICE — KENDALL SCD EXPRESS SLEEVES, KNEE LENGTH, MEDIUM: Brand: KENDALL SCD

## (undated) DEVICE — SLV SCD CALF HEMOFORCE DVT THERP REPROC MD

## (undated) DEVICE — SUT VIC 0 CT1 36IN UD VCP946H

## (undated) DEVICE — DRSNG TEGADERM FRAME/ORIG 4X10IN LF

## (undated) DEVICE — SOL IRR H2O BTL 1000ML STRL